# Patient Record
Sex: FEMALE | Race: WHITE | NOT HISPANIC OR LATINO | Employment: FULL TIME | URBAN - METROPOLITAN AREA
[De-identification: names, ages, dates, MRNs, and addresses within clinical notes are randomized per-mention and may not be internally consistent; named-entity substitution may affect disease eponyms.]

---

## 2017-01-09 ENCOUNTER — OFFICE VISIT (OUTPATIENT)
Dept: FAMILY MEDICINE | Facility: OTHER | Age: 47
End: 2017-01-09
Payer: COMMERCIAL

## 2017-01-09 VITALS
SYSTOLIC BLOOD PRESSURE: 96 MMHG | TEMPERATURE: 98.6 F | HEIGHT: 67 IN | WEIGHT: 162.2 LBS | RESPIRATION RATE: 16 BRPM | HEART RATE: 65 BPM | DIASTOLIC BLOOD PRESSURE: 56 MMHG | OXYGEN SATURATION: 96 % | BODY MASS INDEX: 25.46 KG/M2

## 2017-01-09 DIAGNOSIS — J20.9 ACUTE BRONCHITIS WITH SYMPTOMS > 10 DAYS: Primary | ICD-10-CM

## 2017-01-09 PROCEDURE — 99213 OFFICE O/P EST LOW 20 MIN: CPT | Performed by: STUDENT IN AN ORGANIZED HEALTH CARE EDUCATION/TRAINING PROGRAM

## 2017-01-09 RX ORDER — AZITHROMYCIN 250 MG/1
TABLET, FILM COATED ORAL
Qty: 6 TABLET | Refills: 0 | Status: SHIPPED | OUTPATIENT
Start: 2017-01-09 | End: 2017-02-06

## 2017-01-09 ASSESSMENT — PAIN SCALES - GENERAL: PAINLEVEL: NO PAIN (0)

## 2017-01-09 ASSESSMENT — ENCOUNTER SYMPTOMS: COUGH: 1

## 2017-01-09 NOTE — MR AVS SNAPSHOT
After Visit Summary   1/9/2017    Mercedes Segal    MRN: [id number]           Patient Information     Date Of Birth          1970        Visit Information        Provider Department      1/9/2017 5:50 PM Brionna Pino APRN CNP [facility]        Today's Diagnoses     Acute bronchitis with symptoms > 10 days    -  1       Care Instructions    Azithromycin - take as directed on packaging.    Return to clinic if symptoms are worsening or not improving.    Deborah Pino CNP  [phone]    1.  Stay hydrated and rest - Drink plenty of fluid, 8-10 glasses of water per day and get lots of rest.    2. Mucinex - you can try Mucinex (immediate release) 200-400 mg every 4 hours OR Mucinex (extended release) 600-1200 mg every 12 hours as needed for congestion.  Mucinex helps thin out your mucus so that it is easier to get it out of your body.    3.  Flonase - 1 spray in each nostril daily.  This is a steroid nasal spray that can help decrease inflammation in the nose and sinuses resulting in less nasal congestion.    4. Oral decongestants - you may take a nasal decongestant for up to 3 days as needed.  Sudafed (pseudoephedrine) can be used if you do not have high blood pressure.  The dosing for Sudafed is 60 mg every 4-6 hours as needed for immediate release.  For extended release it is 120 mg every 12 hours.  If you have high blood pressure you can try Coricidin HBP.    5. Nasal decongestant - Afrin nasal spray 2-3 sprays in each nostril twice daily for up to 3 days can help improve nasal congestion.  Do not use longer than 3 days as congestion can worsen with prolonged use.    6. Humidifier - use a humidifier in the places you spend the most time.  This will help moisturize the air you breath in and help improve cough.    7. Headache, sore throat, fever - you can try ibuprofen (maximum of 2400 mg/24 hours) or Tylenol (maximum of 3,000 mg/24 hours).    - do not use Tylenol if you  "have liver disease  - do not use Ibuprofen if you have kidney disease or take a blood thinner.    8. Vicks VapoRub or Mentholatum - you may try rubbing this on your chest, under your nose and over the top of your nose for congestion.  Avoid contact with eyes, mouth and inside of nose.    9. Sore throat - gargle with warm salt water as needed.  Ibuprofen can help.  Chloraseptic throat lozenges or spray can help numb the throat.       If your symptoms worsen or do not improve after lasting 10 days, please return to the clinic.    Deborah Pino, [facility]  [phone]                  Follow-ups after your visit        Who to contact     If you have questions or need follow up information about today's clinic visit or your schedule please contact [facility] directly at [phone].  Normal or non-critical lab and imaging results will be communicated to you by [employer]Dhart, letter or phone within 4 business days after the clinic has received the results. If you do not hear from us within 7 days, please contact the clinic through Was[employer]Dhart or phone. If you have a critical or abnormal lab result, we will notify you by phone as soon as possible.  Submit refill requests through [email or web address] or call your pharmacy and they will forward the refill request to us. Please allow 3 business days for your refill to be completed.          Additional Information About Your Visit        Power.com Information     [email or web address] lets you send messages to your doctor, view your test results, renew your prescriptions, schedule appointments and more. To sign up, go to [email or web address] . Click on \"Log in\" on the left side of the screen, which will take you to the Welcome page. Then click on \"Sign up Now\" on the right side of the page.     You will be asked to enter the access code listed below, as well as some personal information. Please follow the directions to create your username and password.     Your access code is: [email or web address]  Expires: " "2017  6:14 PM     Your access code will  in 90 days. If you need help or a new code, please call your [facility] or [phone].        Care EveryWhere ID     This is your Care EveryWhere ID. This could be used by other organizations to access your [place] medical records  [id number]        Your Vitals Were     Pulse Temperature Respirations Height BMI (Body Mass Index) Pulse Oximetry    65 98.6  F (37  C) (Temporal) 16 5' 6.53\" (1.69 m) 25.76 kg/m2 96%       Blood Pressure from Last 3 Encounters:   17 96/56   16 96/64   16 104/64    Weight from Last 3 Encounters:   17 162 lb 3.2 oz (73.573 kg)   16 166 lb (75.297 kg)   16 167 lb (75.751 kg)              Today, you had the following     No orders found for display         Today's Medication Changes          These changes are accurate as of: 17  6:14 PM.  If you have any questions, ask your nurse or doctor.               Start taking these medicines.        Dose/Directions    azithromycin 250 MG tablet   Commonly known as:  ZITHROMAX   Used for:  Acute bronchitis with symptoms > 10 days   Started by:  Brionna Pino APRN CNP        Two tablets first day, then one tablet daily for four days.   Quantity:  6 tablet   Refills:  0            Where to get your medicines      These medications were sent to [id number] - [place], [place] - [address]  [address] [address]    Hours:  Formerly [place] - numbers unchanged   03  Phone:  [phone]    - azithromycin 250 MG tablet             Primary Care Provider Office Phone # Fax #    Sejal Garcia [phone]       [facility]  [address] [address]        Thank you!     Thank you for choosing [facility]  for your care. Our goal is always to provide you with excellent care. Hearing back from our patients is one way we can continue to improve our services. Please take a " few minutes to complete the written survey that you may receive in the mail after your visit with us. Thank you!             Your Updated Medication List - Protect others around you: Learn how to safely use, store and throw away your medicines at www.disposemymeds.org.          This list is accurate as of: 1/9/17  6:14 PM.  Always use your most recent med list.                   Brand Name Dispense Instructions for use    azithromycin 250 MG tablet    ZITHROMAX    6 tablet    Two tablets first day, then one tablet daily for four days.       buPROPion 150 MG 24 hr tablet    WELLBUTRIN XL    30 tablet    Take 1 tablet (150 mg) by mouth every morning       * nicotine 14 MG/24HR 24 hr patch    NICODERM CQ    30 patch    Place 1 patch onto the skin every 24 hours       * nicotine 21 MG/24HR 24 hr patch    NICODERM CQ    30 patch    Place 1 patch onto the skin every 24 hours       * Notice:  This list has 2 medication(s) that are the same as other medications prescribed for you. Read the directions carefully, and ask your doctor or other care provider to review them with you.

## 2017-01-09 NOTE — PROGRESS NOTES
"  SUBJECTIVE:                                                    Mercedes Segal is a 46 year old female who presents to clinic today for the following health issues:      Cough        Acute Illness   Acute illness concerns: Cough, chest congestion  Onset: 2 weeks     Fever: no    Chills/Sweats: no    Headache (location?): no    Sinus Pressure:no    Conjunctivitis:  no    Ear Pain: no    Rhinorrhea: no    Congestion: YES- chest    Sore Throat: no     Cough: YES-productive of yellow sputum    Wheeze: no    Decreased Appetite: no    Nausea: no    Vomiting: no    Diarrhea:  YES - when taking mucinex    Dysuria/Freq.: no    Fatigue/Achiness: YES    Sick/Strep Exposure: no     Therapies Tried and outcome: Mucinex DM - helps with cough  Smoke      Problem list and histories reviewed & adjusted, as indicated.  Additional history: as documented    Labs reviewed in EPIC  Problem list, Medication list, Allergies, and Medical/Social/Surgical histories reviewed in [facility] and updated as appropriate.    ROS:  Constitutional, HEENT, cardiovascular, pulmonary, gi and gu systems are negative, except as otherwise noted.    OBJECTIVE:                                                    BP 96/56 mmHg  Pulse 65  Temp(Src) 98.6  F (37  C) (Temporal)  Resp 16  Ht 5' 6.53\" (1.69 m)  Wt 162 lb 3.2 oz (73.573 kg)  BMI 25.76 kg/m2  SpO2 96%  Body mass index is 25.76 kg/(m^2).  GENERAL: healthy, alert and no distress  EYES: Eyes grossly normal to inspection, PERRL and conjunctivae and sclerae normal  HENT: ear canals and TM's normal, nose and mouth without ulcers or lesions  NECK: no adenopathy, no asymmetry, masses, or scars  RESP: lungs clear to auscultation - no rales, rhonchi or wheezes  CV: regular rate and rhythm, normal S1 S2, no S3 or S4, no murmur, click or rub  MS: no gross musculoskeletal defects noted, no edema  SKIN: no suspicious lesions or rashes  PSYCH: mentation appears normal, affect normal/bright    Diagnostic Test " Results:  none      ASSESSMENT/PLAN:                                                      Mercedes was seen today for cough.    Diagnoses and all orders for this visit:    Acute bronchitis with symptoms > 10 days  -     azithromycin (ZITHROMAX) 250 MG tablet; Two tablets first day, then one tablet daily for four days.    Encouraged supportive measures.  Treat with antibiotic as symptoms are lasting 14 days and worsening.  RTC if symptoms persist or fail to improve.    See Patient Instructions  Patient Instructions   Azithromycin - take as directed on packaging.    Return to clinic if symptoms are worsening or not improving.    Deborah Pino, CNP  [phone]    1.  Stay hydrated and rest - Drink plenty of fluid, 8-10 glasses of water per day and get lots of rest.    2. Mucinex - you can try Mucinex (immediate release) 200-400 mg every 4 hours OR Mucinex (extended release) 600-1200 mg every 12 hours as needed for congestion.  Mucinex helps thin out your mucus so that it is easier to get it out of your body.    3.  Flonase - 1 spray in each nostril daily.  This is a steroid nasal spray that can help decrease inflammation in the nose and sinuses resulting in less nasal congestion.    4. Oral decongestants - you may take a nasal decongestant for up to 3 days as needed.  Sudafed (pseudoephedrine) can be used if you do not have high blood pressure.  The dosing for Sudafed is 60 mg every 4-6 hours as needed for immediate release.  For extended release it is 120 mg every 12 hours.  If you have high blood pressure you can try Coricidin HBP.    5. Nasal decongestant - Afrin nasal spray 2-3 sprays in each nostril twice daily for up to 3 days can help improve nasal congestion.  Do not use longer than 3 days as congestion can worsen with prolonged use.    6. Humidifier - use a humidifier in the places you spend the most time.  This will help moisturize the air you breath in and help improve cough.    7. Headache, sore throat, fever -  you can try ibuprofen (maximum of 2400 mg/24 hours) or Tylenol (maximum of 3,000 mg/24 hours).    - do not use Tylenol if you have liver disease  - do not use Ibuprofen if you have kidney disease or take a blood thinner.    8. Vicks VapoRub or Mentholatum - you may try rubbing this on your chest, under your nose and over the top of your nose for congestion.  Avoid contact with eyes, mouth and inside of nose.    9. Sore throat - gargle with warm salt water as needed.  Ibuprofen can help.  Chloraseptic throat lozenges or spray can help numb the throat.       If your symptoms worsen or do not improve after lasting 10 days, please return to the clinic.    Deborah Pino, JENNIFER  [phone]                  Brionna Pino, DANNY [facility]

## 2017-01-10 NOTE — PATIENT INSTRUCTIONS
Azithromycin - take as directed on packaging.    Return to clinic if symptoms are worsening or not improving.    Deborah Pino, CNP  [phone]    1.  Stay hydrated and rest - Drink plenty of fluid, 8-10 glasses of water per day and get lots of rest.    2. Mucinex - you can try Mucinex (immediate release) 200-400 mg every 4 hours OR Mucinex (extended release) 600-1200 mg every 12 hours as needed for congestion.  Mucinex helps thin out your mucus so that it is easier to get it out of your body.    3.  Flonase - 1 spray in each nostril daily.  This is a steroid nasal spray that can help decrease inflammation in the nose and sinuses resulting in less nasal congestion.    4. Oral decongestants - you may take a nasal decongestant for up to 3 days as needed.  Sudafed (pseudoephedrine) can be used if you do not have high blood pressure.  The dosing for Sudafed is 60 mg every 4-6 hours as needed for immediate release.  For extended release it is 120 mg every 12 hours.  If you have high blood pressure you can try Coricidin HBP.    5. Nasal decongestant - Afrin nasal spray 2-3 sprays in each nostril twice daily for up to 3 days can help improve nasal congestion.  Do not use longer than 3 days as congestion can worsen with prolonged use.    6. Humidifier - use a humidifier in the places you spend the most time.  This will help moisturize the air you breath in and help improve cough.    7. Headache, sore throat, fever - you can try ibuprofen (maximum of 2400 mg/24 hours) or Tylenol (maximum of 3,000 mg/24 hours).    - do not use Tylenol if you have liver disease  - do not use Ibuprofen if you have kidney disease or take a blood thinner.    8. Vicks VapoRub or Mentholatum - you may try rubbing this on your chest, under your nose and over the top of your nose for congestion.  Avoid contact with eyes, mouth and inside of nose.    9. Sore throat - gargle with warm salt water as needed.  Ibuprofen can help.  Chloraseptic throat  lozenges or spray can help numb the throat.       If your symptoms worsen or do not improve after lasting 10 days, please return to the clinic.    Deborah Pino, [facility]  [phone]

## 2017-01-10 NOTE — NURSING NOTE
"Chief Complaint   Patient presents with     Cough       Initial BP 96/56 mmHg  Pulse 65  Temp(Src) 98.6  F (37  C) (Temporal)  Resp 16  Ht 5' 6.53\" (1.69 m)  Wt 162 lb 3.2 oz (73.573 kg)  BMI 25.76 kg/m2  SpO2 96% Estimated body mass index is 25.76 kg/(m^2) as calculated from the following:    Height as of this encounter: 5' 6.54\" (1.69 m).    Weight as of this encounter: 162 lb 3.2 oz (73.573 kg).  BP completed using cuff size: arsen Saldana CMA      "

## 2017-01-18 ENCOUNTER — OFFICE VISIT (OUTPATIENT)
Dept: FAMILY MEDICINE | Facility: OTHER | Age: 47
End: 2017-01-18
Payer: COMMERCIAL

## 2017-01-18 VITALS
BODY MASS INDEX: 25.8 KG/M2 | HEIGHT: 67 IN | TEMPERATURE: 98 F | HEART RATE: 70 BPM | SYSTOLIC BLOOD PRESSURE: 100 MMHG | DIASTOLIC BLOOD PRESSURE: 56 MMHG | WEIGHT: 164.4 LBS | RESPIRATION RATE: 16 BRPM | OXYGEN SATURATION: 96 %

## 2017-01-18 DIAGNOSIS — R10.13 EPIGASTRIC PAIN: ICD-10-CM

## 2017-01-18 DIAGNOSIS — K80.42 CALCULUS OF BILE DUCT WITH ACUTE CHOLECYSTITIS WITHOUT OBSTRUCTION: Primary | ICD-10-CM

## 2017-01-18 LAB
ALBUMIN SERPL-MCNC: 3.6 G/DL (ref 3.4–5)
ALP SERPL-CCNC: 43 U/L (ref 40–150)
ALT SERPL W P-5'-P-CCNC: 31 U/L (ref 0–50)
ANION GAP SERPL CALCULATED.3IONS-SCNC: 5 MMOL/L (ref 3–14)
AST SERPL W P-5'-P-CCNC: 20 U/L (ref 0–45)
BASOPHILS # BLD AUTO: 0 10E9/L (ref 0–0.2)
BASOPHILS NFR BLD AUTO: 0.2 %
BILIRUB SERPL-MCNC: 0.4 MG/DL (ref 0.2–1.3)
BUN SERPL-MCNC: 13 MG/DL (ref 7–30)
CALCIUM SERPL-MCNC: 8.5 MG/DL (ref 8.5–10.1)
CHLORIDE SERPL-SCNC: 106 MMOL/L (ref 94–109)
CO2 SERPL-SCNC: 29 MMOL/L (ref 20–32)
CREAT SERPL-MCNC: 0.74 MG/DL (ref 0.52–1.04)
DIFFERENTIAL METHOD BLD: NORMAL
EOSINOPHIL # BLD AUTO: 0.2 10E9/L (ref 0–0.7)
EOSINOPHIL NFR BLD AUTO: 2 %
ERYTHROCYTE [DISTWIDTH] IN BLOOD BY AUTOMATED COUNT: 13.8 % (ref 10–15)
GFR SERPL CREATININE-BSD FRML MDRD: 85 ML/MIN/1.7M2
GLUCOSE SERPL-MCNC: 87 MG/DL (ref 70–99)
HCT VFR BLD AUTO: 39.3 % (ref 35–47)
HGB BLD-MCNC: 13.2 G/DL (ref 11.7–15.7)
LIPASE SERPL-CCNC: 142 U/L (ref 73–393)
LYMPHOCYTES # BLD AUTO: 2.3 10E9/L (ref 0.8–5.3)
LYMPHOCYTES NFR BLD AUTO: 23.4 %
MCH RBC QN AUTO: 30.5 PG (ref 26.5–33)
MCHC RBC AUTO-ENTMCNC: 33.6 G/DL (ref 31.5–36.5)
MCV RBC AUTO: 91 FL (ref 78–100)
MONOCYTES # BLD AUTO: 0.8 10E9/L (ref 0–1.3)
MONOCYTES NFR BLD AUTO: 8.2 %
NEUTROPHILS # BLD AUTO: 6.4 10E9/L (ref 1.6–8.3)
NEUTROPHILS NFR BLD AUTO: 66.2 %
PLATELET # BLD AUTO: 324 10E9/L (ref 150–450)
POTASSIUM SERPL-SCNC: 4.1 MMOL/L (ref 3.4–5.3)
PROT SERPL-MCNC: 6.7 G/DL (ref 6.8–8.8)
RBC # BLD AUTO: 4.33 10E12/L (ref 3.8–5.2)
SODIUM SERPL-SCNC: 140 MMOL/L (ref 133–144)
WBC # BLD AUTO: 9.6 10E9/L (ref 4–11)

## 2017-01-18 PROCEDURE — 99214 OFFICE O/P EST MOD 30 MIN: CPT | Performed by: STUDENT IN AN ORGANIZED HEALTH CARE EDUCATION/TRAINING PROGRAM

## 2017-01-18 PROCEDURE — 36415 COLL VENOUS BLD VENIPUNCTURE: CPT | Performed by: STUDENT IN AN ORGANIZED HEALTH CARE EDUCATION/TRAINING PROGRAM

## 2017-01-18 PROCEDURE — 83690 ASSAY OF LIPASE: CPT | Performed by: STUDENT IN AN ORGANIZED HEALTH CARE EDUCATION/TRAINING PROGRAM

## 2017-01-18 PROCEDURE — 80053 COMPREHEN METABOLIC PANEL: CPT | Performed by: STUDENT IN AN ORGANIZED HEALTH CARE EDUCATION/TRAINING PROGRAM

## 2017-01-18 PROCEDURE — 85025 COMPLETE CBC W/AUTO DIFF WBC: CPT | Performed by: STUDENT IN AN ORGANIZED HEALTH CARE EDUCATION/TRAINING PROGRAM

## 2017-01-18 ASSESSMENT — PAIN SCALES - GENERAL: PAINLEVEL: EXTREME PAIN (9)

## 2017-01-18 NOTE — MR AVS SNAPSHOT
"              After Visit Summary   1/18/2017    Mercedes Segal    MRN: [id number]           Patient Information     Date Of Birth          1970        Visit Information        Provider Department      1/18/2017 10:40 AM Brionna Pino APRN CNP [facility]        Today's Diagnoses     Epigastric pain    -  1       Care Instructions    Lab work - I will call you with results.    Ultrasound     Go to ER if symptoms worsen significantly, you develop shortness of breath or chest pain.    Deborah Pino CNP  [phone]        Follow-ups after your visit        Future tests that were ordered for you today     Open Future Orders        Priority Expected Expires Ordered    US Abdomen Complete Routine  1/18/2018 1/18/2017            Who to contact     If you have questions or need follow up information about today's clinic visit or your schedule please contact [facility] directly at [phone].  Normal or non-critical lab and imaging results will be communicated to you by MyChart, letter or phone within 4 business days after the clinic has received the results. If you do not hear from us within 7 days, please contact the clinic through MyChart or phone. If you have a critical or abnormal lab result, we will notify you by phone as soon as possible.  Submit refill requests through Muzui or call your pharmacy and they will forward the refill request to us. Please allow 3 business days for your refill to be completed.          Additional Information About Your Visit        MyChart Information     Muzui lets you send messages to your doctor, view your test results, renew your prescriptions, schedule appointments and more. To sign up, go to [email or web address] . Click on \"Log in\" on the left side of the screen, which will take you to the Welcome page. Then click on \"Sign up Now\" on the right side of the page.     You will be asked to enter the access code listed below, as " "well as some personal information. Please follow the directions to create your username and password.     Your access code is: [email or web address]  Expires: 2017  6:14 PM     Your access code will  in 90 days. If you need help or a new code, please call your [facility] clinic or [phone].        Care EveryWhere ID     This is your Care EveryWhere ID. This could be used by other organizations to access your [place] medical records  [id number]        Your Vitals Were     Pulse Temperature Respirations    70 98  F (36.7  C) (Temporal) 16    Height BMI (Body Mass Index) Pulse Oximetry    5' 6.53\" (1.69 m) 26.11 kg/m2 96%    Last Period          2017 (Approximate)         Blood Pressure from Last 3 Encounters:   17 100/56   17 96/56   16 96/64    Weight from Last 3 Encounters:   17 164 lb 6.4 oz (74.571 kg)   17 162 lb 3.2 oz (73.573 kg)   16 166 lb (75.297 kg)              We Performed the Following     CBC with platelets and differential     Comprehensive metabolic panel (BMP + Alb, Alk Phos, ALT, AST, Total. Bili, TP)     Lipase        Primary Care Provider Office Phone # Fax #    Sejal Garcia [phone]       [facility]  [address] [address]        Thank you!     Thank you for choosing [facility]  for your care. Our goal is always to provide you with excellent care. Hearing back from our patients is one way we can continue to improve our services. Please take a few minutes to complete the written survey that you may receive in the mail after your visit with us. Thank you!             Your Updated Medication List - Protect others around you: Learn how to safely use, store and throw away your medicines at www.disposemymeds.org.          This list is accurate as of: 17 11:18 AM.  Always use your most recent med list.                   Brand Name Dispense Instructions for use    azithromycin 250 MG " tablet    ZITHROMAX    6 tablet    Two tablets first day, then one tablet daily for four days.       buPROPion 150 MG 24 hr tablet    WELLBUTRIN XL    30 tablet    Take 1 tablet (150 mg) by mouth every morning       * nicotine 14 MG/24HR 24 hr patch    NICODERM CQ    30 patch    Place 1 patch onto the skin every 24 hours       * nicotine 21 MG/24HR 24 hr patch    NICODERM CQ    30 patch    Place 1 patch onto the skin every 24 hours       * Notice:  This list has 2 medication(s) that are the same as other medications prescribed for you. Read the directions carefully, and ask your doctor or other care provider to review them with you.

## 2017-01-18 NOTE — PATIENT INSTRUCTIONS
Lab work - I will call you with results.    Ultrasound     Go to ER if symptoms worsen significantly, you develop shortness of breath or chest pain.    Deborah Pino, CNP  [phone]

## 2017-01-18 NOTE — PROGRESS NOTES
"  SUBJECTIVE:                                                    Mercedes Segal is a 46 year old female who presents to clinic today for the following health issues:      ABDOMINAL PAIN     Onset: 4 days     Description:   Character: Sharp  Location: epigastric region  Radiation: None    Intensity: severe    Progression of Symptoms:  worsening    Accompanying Signs & Symptoms:  Fever/Chills?: no   Gas/Bloating: YES- bloated when waking up from pain  Nausea: no   Vomitting: no   Diarrhea?: no   Constipation:no   Dysuria or Hematuria: no    History:   Trauma: no   Previous similar pain: YES- around [place]   Previous tests done: none    Precipitating factors:   Does the pain change with:     Food: no      BM: no     Urination: no     Alleviating factors:  Ibuprofen    Therapies Tried and outcome: Ibuprofen - helps with pain    LMP:  Couple weeks ago. Approx 1/1/2017     Pt states pain only comes in the middle of the night and wakes her up. She initially thought it was pork when she had pain the first time around [place] and stopped eating pork. But still has pain.    Pt states she has diarrhea when eating salad. Wondering why she would have that. When coughing or sneezing, she urinates a little bit and wondering if there is something she can do about that.     Two hours after ibuprofen to relieve pain  Smokes - 1 pack a day     Problem list and histories reviewed & adjusted, as indicated.  Additional history: as documented    Labs reviewed in EPIC  Problem list, Medication list, Allergies, and Medical/Social/Surgical histories reviewed in [facility] and updated as appropriate.    ROS:  Constitutional, HEENT, cardiovascular, pulmonary, gi and gu systems are negative, except as otherwise noted.    OBJECTIVE:                                                    /56 mmHg  Pulse 70  Temp(Src) 98  F (36.7  C) (Temporal)  Resp 16  Ht 5' 6.53\" (1.69 m)  Wt 164 lb 6.4 oz (74.571 kg)  BMI 26.11 kg/m2  SpO2 96%  LMP " 01/01/2017 (Approximate)  Body mass index is 26.11 kg/(m^2).  GENERAL: healthy, alert and no distress  EYES: Eyes grossly normal to inspection, PERRL and conjunctivae and sclerae normal  HENT: ear canals and TM's normal, nose and mouth without ulcers or lesions  NECK: no adenopathy, no asymmetry, masses, or scars and thyroid normal to palpation  RESP: lungs clear to auscultation - no rales, rhonchi or wheezes  CV: regular rate and rhythm, normal S1 S2, no S3 or S4, no murmur, click or rub  ABDOMEN: tender to palpation over mid-upper abdomen and epigastric region, soft, no hepatosplenomegaly, no masses and bowel sounds normal, no hernia noted  MS: no gross musculoskeletal defects noted, no edema  SKIN: no suspicious lesions or rashes  NEURO: Normal strength and tone, mentation intact and speech normal  BACK: no CVA tenderness, no paralumbar tenderness  PSYCH: mentation appears normal, affect normal/bright  LYMPH: no cervical, supraclavicular, adenopathy    Diagnostic Test Results:  Lipase, CMP and CBC normal     ASSESSMENT/PLAN:                                                      Mercedes was seen today for abdominal pain.    Diagnoses and all orders for this visit:    Calculus of bile duct with acute cholecystitis without obstruction    Epigastric pain  -     Lipase  -     CBC with platelets and differential  -     Comprehensive metabolic panel (BMP + Alb, Alk Phos, ALT, AST, Total. Bili, TP)  -     Cancel: US Abdomen Complete; Future      Ultrasound of RUQ showed cholelithiasis.  GI referral was placed but is scheduling out about a month.  Will see if we can get her in sooner or may also consider general surgery referral if needed to get in sooner.      See Patient Instructions  Patient Instructions   Lab work - I will call you with results.    Ultrasound     Go to ER if symptoms worsen significantly, you develop shortness of breath or chest pain.    Deborah Pino, CNP  [phone]        Brionna Pino, APRN  [facility]

## 2017-01-18 NOTE — NURSING NOTE
"Chief Complaint   Patient presents with     Abdominal Pain       Initial /56 mmHg  Pulse 70  Temp(Src) 98  F (36.7  C) (Temporal)  Resp 16  Ht 5' 6.53\" (1.69 m)  Wt 164 lb 6.4 oz (74.571 kg)  BMI 26.11 kg/m2  SpO2 96% Estimated body mass index is 26.11 kg/(m^2) as calculated from the following:    Height as of this encounter: 5' 6.54\" (1.69 m).    Weight as of this encounter: 164 lb 6.4 oz (74.571 kg).  BP completed using cuff size: arsen Saldana CMA      "

## 2017-01-19 ENCOUNTER — RADIANT APPOINTMENT (OUTPATIENT)
Dept: ULTRASOUND IMAGING | Facility: OTHER | Age: 47
End: 2017-01-19
Attending: STUDENT IN AN ORGANIZED HEALTH CARE EDUCATION/TRAINING PROGRAM
Payer: COMMERCIAL

## 2017-01-19 DIAGNOSIS — R10.13 EPIGASTRIC PAIN: ICD-10-CM

## 2017-01-19 PROCEDURE — 76705 ECHO EXAM OF ABDOMEN: CPT

## 2017-01-20 ENCOUNTER — TELEPHONE (OUTPATIENT)
Dept: FAMILY MEDICINE | Facility: OTHER | Age: 47
End: 2017-01-20

## 2017-01-20 DIAGNOSIS — K80.42 CALCULUS OF BILE DUCT WITH ACUTE CHOLECYSTITIS WITHOUT OBSTRUCTION: Primary | ICD-10-CM

## 2017-01-20 NOTE — TELEPHONE ENCOUNTER
Spoke to patient and informed her that [facility] GI see's patient at [place]. She was fine with that.  Faxed order to [facility] to schedule at  [place].

## 2017-01-20 NOTE — TELEPHONE ENCOUNTER
PT called and stated she call  to see a gastroenterology but the first time she can get in will be the end of Feburay and that is just for a conusut . She wanted to know if there is another gastroenterology somewhere else where she can be seen sooner .Please call pt at .phone [phone].

## 2017-01-20 NOTE — TELEPHONE ENCOUNTER
[facility] phone call message- patient request for an order or referral:    Order or referral being requested: order  Reason for request: gastrology   Date needed: as soon as possible  Has the patient been seen by the PCP for this problem? YES    Additional comments: Patient wants the order to be for [facility] and [facility]    OK to leave the result message on voice mail or with a family member? YES    Call taken on 1/20/2017 at 9:17 AM by Monica Redding

## 2017-01-20 NOTE — TELEPHONE ENCOUNTER
GI has been really hard to get into- I checked [facility] and their first opening end June. The [facility] is into May. I wanted to inform you before I call patient. Not sure if you have any other ideas for patient or not.

## 2017-01-23 NOTE — TELEPHONE ENCOUNTER
Talked to patient and she was able to get in with general surgery in [place] this Wednesday.  Deborah Pino, CNP

## 2017-01-25 ENCOUNTER — TRANSFERRED RECORDS (OUTPATIENT)
Dept: HEALTH INFORMATION MANAGEMENT | Facility: CLINIC | Age: 47
End: 2017-01-25

## 2017-01-31 NOTE — PROGRESS NOTES
[address]  [facility] [address]  [phone]  Dept: [phone]    PRE-OP EVALUATION:  Today's date: 2017    Mercedes Segal (: 1970) presents for pre-operative evaluation assessment as requested by Dr. Garcia.  She requires evaluation and anesthesia risk assessment prior to undergoing surgery/procedure for treatment of gallstones.  Proposed procedure: Gall bladder removal    Date of Surgery/ Procedure: 2017  Time of Surgery/ Procedure: 10:15 AM  Hospital/Surgical Facility: [facility]  Fax number for surgical facility: [phone]  Primary Physician: Sejal Garcia  Type of Anesthesia Anticipated: General    Patient has a Health Care Directive or Living Will:  NO    Preop Questions 2017   1.  Do you have a history of heart attack, stroke, stent, bypass or surgery on an artery in the head, neck, heart or legs? No   2.  Do you ever have any pain or discomfort in your chest? No   3.  Do you have a history of  Heart Failure? No   4.   Are you troubled by shortness of breath when:  walking on a level surface, or up a slight hill, or at night? No   5.  Do you currently have a cold, bronchitis or other respiratory infection? YES - 1-1.5 weeks ago   6.  Do you have a cough, shortness of breath, or wheezing? No   7.  Do you sometimes get pains in the calves of your legs when you walk? No   8. Do you or anyone in your family have previous history of blood clots? No   9.  Do you or does anyone in your family have a serious bleeding problem such as prolonged bleeding following surgeries or cuts? No   10. Have you ever had problems with anemia or been told to take iron pills? No   11. Have you had any abnormal blood loss such as black, tarry or bloody stools, or abnormal vaginal bleeding? No   12. Have you ever had a blood transfusion? No   13. Have you or any of your relatives ever had problems with  anesthesia? No   14. Do you have sleep apnea, excessive snoring or daytime drowsiness? No   15. Do you have any prosthetic heart valves? No   16. Do you have prosthetic joints? No   17. Is there any chance that you may be pregnant? No           HPI:                                                      Brief HPI related to upcoming procedure: gallstones      See problem list for active medical problems.  Problems all longstanding and stable, except as noted/documented.  See ROS for pertinent symptoms related to these conditions.                                                                                                  .    MEDICAL HISTORY:                                                      Patient Active Problem List    Diagnosis Date Noted     Warts 2015     Priority: Medium     History of gestational diabetes 2012     Menometrorrhagia 2012      Past Medical History   Diagnosis Date     Tobacco use disorder      Past Surgical History   Procedure Laterality Date     Tonsillectomy       Dilation and curettage        section       Tubal ligation       Hc tooth extraction w/forcep       [facility]      Current Outpatient Prescriptions   Medication Sig Dispense Refill     azithromycin (ZITHROMAX) 250 MG tablet Two tablets first day, then one tablet daily for four days. 6 tablet 0     buPROPion (WELLBUTRIN XL) 150 MG 24 hr tablet Take 1 tablet (150 mg) by mouth every morning 30 tablet 3     nicotine (NICODERM CQ) 21 MG/24HR patch 2h hr Place 1 patch onto the skin every 24 hours 30 patch 3     nicotine (NICODERM CQ) 14 MG/24HR patch 2h hr Place 1 patch onto the skin every 24 hours 30 patch 1     OTC products: ibuprofen    Allergies   Allergen Reactions     Penicillins Rash     Pt was a baby      Latex Allergy: NO    Social History   Substance Use Topics     Smoking status: Current Every Day Smoker -- 1.00 packs/day for 20 years     Types: Cigarettes     Smokeless tobacco: Never Used      Alcohol Use: No     History   Drug Use No       REVIEW OF SYSTEMS:                                                    Constitutional, neuro, ENT, endocrine, pulmonary, cardiac, gastrointestinal, genitourinary, musculoskeletal, integument and psychiatric systems are negative, except as otherwise noted.    EXAM:                                                    LMP 01/01/2017 (Approximate)    GENERAL APPEARANCE: healthy, alert and no distress     EYES: EOMI,- PERRL     HENT: ear canals and TM's normal and nose and mouth without ulcers or lesions     NECK: no adenopathy, no asymmetry, masses, or scars and thyroid normal to palpation     RESP: lungs clear to auscultation - no rales, rhonchi or wheezes     BREAST: normal without masses, tenderness or nipple discharge and no palpable axillary masses or adenopathy     CV: regular rates and rhythm, normal S1 S2, no S3 or S4 and no murmur, click or rub -     ABDOMEN:  soft, nontender, no HSM or masses and bowel sounds normal     : normal cervix, adnexae, and uterus without masses or discharge and rectal exam normal without masses-guaiac negative stool     MS: extremities normal- no gross deformities noted, no evidence of inflammation in joints, FROM in all extremities.     SKIN: no suspicious lesions or rashes     NEURO: Normal strength and tone, sensory exam grossly normal, mentation intact and speech normal     PSYCH: mentation appears normal. and affect normal/bright     LYMPHATICS: No axillary, cervical, inguinal, or supraclavicular nodes    DIAGNOSTICS:                                                    No labs or EKG required for low risk surgery (cataract, skin procedure, breast biopsy, etc)    Recent Labs   Lab Test  01/18/17   1132  05/23/16   0801  04/22/10   HGB  13.2  13.6   < >   --    PLT  324  335   < >   --    NA  140  142   --    --    POTASSIUM  4.1  4.2   --   4.1   CR  0.74  0.73   --   0.68   A1C   --    --    --   5.1    < > = values in this  interval not displayed.        IMPRESSION:                                                    Reason for surgery/procedure: gallstones  Diagnosis/reason for consult: preop    The proposed surgical procedure is considered LOW risk.    REVISED CARDIAC RISK INDEX  The patient has the following serious cardiovascular risks for perioperative complications such as (MI, PE, VFib and 3  AV Block):  No serious cardiac risks  INTERPRETATION: 0 risks: Class I (very low risk - 0.4% complication rate)    The patient has the following additional risks for perioperative complications:  No identified additional risks    No diagnosis found.    RECOMMENDATIONS:                                                        APPROVAL GIVEN to proceed with proposed procedure, without further diagnostic evaluation       Signed Electronically by: DANNY Muniz CNP    Copy of this evaluation report is provided to requesting physician.    [facility] Preop Guidelines

## 2017-02-06 ENCOUNTER — OFFICE VISIT (OUTPATIENT)
Dept: FAMILY MEDICINE | Facility: OTHER | Age: 47
End: 2017-02-06
Payer: COMMERCIAL

## 2017-02-06 VITALS
RESPIRATION RATE: 16 BRPM | OXYGEN SATURATION: 97 % | SYSTOLIC BLOOD PRESSURE: 108 MMHG | HEIGHT: 67 IN | BODY MASS INDEX: 26.02 KG/M2 | DIASTOLIC BLOOD PRESSURE: 64 MMHG | WEIGHT: 165.8 LBS | TEMPERATURE: 98.7 F | HEART RATE: 69 BPM

## 2017-02-06 DIAGNOSIS — Z01.818 PREOP GENERAL PHYSICAL EXAM: Primary | ICD-10-CM

## 2017-02-06 PROCEDURE — 99214 OFFICE O/P EST MOD 30 MIN: CPT | Performed by: STUDENT IN AN ORGANIZED HEALTH CARE EDUCATION/TRAINING PROGRAM

## 2017-02-06 ASSESSMENT — PAIN SCALES - GENERAL: PAINLEVEL: NO PAIN (0)

## 2017-02-06 NOTE — MR AVS SNAPSHOT
After Visit Summary   2/6/2017    Mercedes Segal    MRN: [id number]           Patient Information     Date Of Birth          1970        Visit Information        Provider Department      2/6/2017 5:50 PM Brionna Pino APRN CNP [facility]        Today's Diagnoses     Preop general physical exam    -  1      Care Instructions      Before Your Surgery      Call your surgeon if there is any change in your health. This includes signs of a cold or flu (such as a sore throat, runny nose, cough, rash or fever).    Do not smoke, drink alcohol or take over the counter medicine (unless your surgeon or primary care doctor tells you to) for the 24 hours before and after surgery.    If you take prescribed drugs: Follow your doctor s orders about which medicines to take and which to stop until after surgery.    Eating and drinking prior to surgery: follow the instructions from your surgeon    Take a shower or bath the night before surgery. Use the soap your surgeon gave you to gently clean your skin. If you do not have soap from your surgeon, use your regular soap. Do not shave or scrub the surgery site.  Wear clean pajamas and have clean sheets on your bed.         Follow-ups after your visit        Who to contact     If you have questions or need follow up information about today's clinic visit or your schedule please contact [facility] directly at [phone].  Normal or non-critical lab and imaging results will be communicated to you by MyChart, letter or phone within 4 business days after the clinic has received the results. If you do not hear from us within 7 days, please contact the clinic through MyChart or phone. If you have a critical or abnormal lab result, we will notify you by phone as soon as possible.  Submit refill requests through [email or web address] or call your pharmacy and they will forward the refill request to us. Please allow 3 business days for your refill  "to be completed.          Additional Information About Your Visit        [employer] Information     [employer] lets you send messages to your doctor, view your test results, renew your prescriptions, schedule appointments and more. To sign up, go to [email or web address] . Click on \"Log in\" on the left side of the screen, which will take you to the Welcome page. Then click on \"Sign up Now\" on the right side of the page.     You will be asked to enter the access code listed below, as well as some personal information. Please follow the directions to create your username and password.     Your access code is: [email or web address]  Expires: 2017  6:14 PM     Your access code will  in 90 days. If you need help or a new code, please call your [facility] clinic or [phone].        Care EveryWhere ID     This is your Care EveryWhere ID. This could be used by other organizations to access your [facility] medical records  [id number]        Your Vitals Were     Pulse Temperature Respirations Height Last Period Pulse Oximetry    69 98.7  F (37.1  C) (Temporal) 16 5' 6.53\" (1.69 m) 2017 (Approximate) 97%    BMI (Body Mass Index)                   26.34 kg/m2            Blood Pressure from Last 3 Encounters:   17 108/64   17 100/56   17 96/56    Weight from Last 3 Encounters:   17 165 lb 12.8 oz (75.2 kg)   17 164 lb 6.4 oz (74.6 kg)   17 162 lb 3.2 oz (73.6 kg)              Today, you had the following     No orders found for display       Primary Care Provider Office Phone # Fax #    Sejal Garcia [phone]       [facility]  [address] [address]        Thank you!     Thank you for choosing [facility]  for your care. Our goal is always to provide you with excellent care. Hearing back from our patients is one way we can continue to improve our services. Please take a few minutes to complete the written survey that you may " receive in the mail after your visit with us. Thank you!             Your Updated Medication List - Protect others around you: Learn how to safely use, store and throw away your medicines at www.disposemymeds.org.          This list is accurate as of: 2/6/17 11:59 PM.  Always use your most recent med list.                   Brand Name Dispense Instructions for use    buPROPion 150 MG 24 hr tablet    WELLBUTRIN XL    30 tablet    Take 1 tablet (150 mg) by mouth every morning       * nicotine 14 MG/24HR 24 hr patch    NICODERM CQ    30 patch    Place 1 patch onto the skin every 24 hours       * nicotine 21 MG/24HR 24 hr patch    NICODERM CQ    30 patch    Place 1 patch onto the skin every 24 hours       * Notice:  This list has 2 medication(s) that are the same as other medications prescribed for you. Read the directions carefully, and ask your doctor or other care provider to review them with you.

## 2017-02-07 NOTE — NURSING NOTE
"Chief Complaint   Patient presents with     Pre-Op Exam     Panel Management     Flu       Initial /64 mmHg  Pulse 69  Temp(Src) 98.7  F (37.1  C) (Temporal)  Resp 16  Ht 5' 6.53\" (1.69 m)  Wt 165 lb 12.8 oz (75.206 kg)  BMI 26.33 kg/m2  SpO2 97%  LMP 01/01/2017 (Approximate) Estimated body mass index is 26.33 kg/(m^2) as calculated from the following:    Height as of this encounter: 5' 6.53\" (1.69 m).    Weight as of this encounter: 165 lb 12.8 oz (75.206 kg).  Medication Reconciliation: complete   Yulia Saldana CMA      "

## 2017-02-22 ENCOUNTER — TRANSFERRED RECORDS (OUTPATIENT)
Dept: HEALTH INFORMATION MANAGEMENT | Facility: CLINIC | Age: 47
End: 2017-02-22

## 2017-10-17 NOTE — PROGRESS NOTES
SUBJECTIVE:                                                    Mercedes Segal is a 47 year old female who presents to clinic today for the following health issues:      HPI    Joint Pain    Onset: x 1 week    Description:   Location: Right Rib Cage  Character: Sharp    Intensity: moderate, 7/10    Progression of Symptoms: better    Accompanying Signs & Symptoms:  Other symptoms: none    History:   Previous similar pain: no       Precipitating factors:   Trauma or overuse: YES- Son picked her up    Alleviating factors:  Improved by: nothing    Therapies Tried and outcome: Ibuprofen    No shortness of breath or chest pain. No bruising. Her son picked her up and she twisted her chest the opposite direction.      Problem list and histories reviewed & adjusted, as indicated.  Additional history: as documented      Current Outpatient Prescriptions   Medication Sig Dispense Refill     ibuprofen 200 MG capsule Take 200 mg by mouth every 4 hours as needed for fever       buPROPion (WELLBUTRIN XL) 150 MG 24 hr tablet Take 1 tablet (150 mg) by mouth every morning (Patient not taking: Reported on 10/20/2017) 30 tablet 3     nicotine (NICODERM CQ) 21 MG/24HR patch 2h hr Place 1 patch onto the skin every 24 hours (Patient not taking: Reported on 10/20/2017) 30 patch 3     nicotine (NICODERM CQ) 14 MG/24HR patch 2h hr Place 1 patch onto the skin every 24 hours (Patient not taking: Reported on 10/20/2017) 30 patch 1     BP Readings from Last 3 Encounters:   10/20/17 102/62   02/06/17 108/64   01/18/17 100/56    Wt Readings from Last 3 Encounters:   10/20/17 161 lb (73 kg)   02/06/17 165 lb 12.8 oz (75.2 kg)   01/18/17 164 lb 6.4 oz (74.6 kg)               Labs reviewed in EPIC      ROS:  C: NEGATIVE for fever, chills, change in weight  E/M: NEGATIVE for ear, mouth and throat problems  R: NEGATIVE for significant cough or SOB  CV: NEGATIVE for chest pain, palpitations or peripheral edema    OBJECTIVE:     /62 (BP Location:  "Right arm, Patient Position: Chair, Cuff Size: Adult Regular)  Pulse 78  Temp 98.3  F (36.8  C) (Oral)  Resp 16  Ht 5' 6.53\" (1.69 m)  Wt 161 lb (73 kg)  SpO2 98%  BMI 25.57 kg/m2  Body mass index is 25.57 kg/(m^2).  GENERAL: healthy, alert and no distress  RESP: lungs clear to auscultation - no rales, rhonchi or wheezes  CV: regular rate and rhythm, normal S1 S2, no S3 or S4, no murmur, click or rub, no peripheral edema and peripheral pulses strong  MS: Right lower ribcage tenderness (underneath right breast), skin intact, no bruising or redness, temperature normal.   SKIN: no suspicious lesions or rashes  NEURO: Normal strength and tone, mentation intact and speech normal  PSYCH: mentation appears normal, affect normal/bright    Diagnostic Test Results:  Xray - XR ribs and chest right side.     ASSESSMENT/PLAN:     1. Rib pain on right side  - Discussed conservative treatment with ice, ibuprofen, rest and deep breathing/coughing with pillow to avoid pneumonia.   - Return to clinic if symptoms worsen or do not improve, could take 6-8 weeks to improve.   - Xray to check for malalignments and fractures.   - ibuprofen (ADVIL/MOTRIN) 800 MG tablet; Take 1 tablet (800 mg) by mouth every 8 hours as needed for moderate pain For the next 1-2 weeks.  Dispense: 60 tablet; Refill: 0  - XR Ribs & Chest Right G/E 3 Views; Future    Patient Instructions   -TYLENOL 1000 mg every 6 hours; maximum daily dose: 3000 mg daily   - Ice 3-4 times daily   - Ibuprofen 800mg every 8 hours with food for the next 1-2 weeks then decrease. Not to exceed 3200mg per day.   - This could take 6-8 weeks to heal, return to clinic with any worsening symptoms, shortness of breath or no resolution.   - Hold a pillow for coughing.     DANNY Wiley CNP          Rib Contusion or Minor Fracture    A rib contusion is a bruise to one or more rib bones. It may cause pain, tenderness, swelling, and a purplish tint to the skin. There may be a " sharp pain with each breath. A rib contusion takes anywhere from a few days to a few weeks to heal. A minor rib fracture or break may cause the same symptoms as a rib contusion. The small crack may not be seen on a regular chest X-ray. Treatment for both problems is the same.  Home care    You may use over-the-counter pain medicine to control pain, unless another pain medicine was prescribed. If you have chronic liver or kidney disease or ever had a stomach ulcer or GI bleeding, talk with your healthcare provider before using these medicines.    Rest. Do not lift anything heavy or do any activity that causes pain.    Apply an ice pack over the injured area for 15 to 20 minutes every 1 to 2 hours. You should do this for the first 24 to 48 hours. You can make an ice pack by filling a plastic bag that seals at the top with ice cubes and then wrapping it with a thin towel. Continue with ice packs as needed for the relief of pain and swelling.    The first 3 to 4 weeks of healing will be the most painful. If your pain is not under control with the treatment given, call your healthcare provider. Sometimes a stronger pain medicine may be needed. A nerve block can be done in case of severe pain. It will numb the nerve between the ribs.  Follow-up care  Follow up with your healthcare provider, or as advised.  If X-rays were taken, you will be told of any new findings that may affect your care.  Call 911  Call 911 if you have:    Dizziness, weakness or fainting    Shortness of breath with or without chest discomfort    New or worsening pain  When to seek medical advice  Call your healthcare provider right away if any of these occur:    Fever of 100.4 F (38 C) or above lasting for 24 to 48 hours    Stomach pain  Date Last Reviewed: 12/2/2015 2000-2017 [employer]. [address] [place] [address], [place] [address]. All rights reserved. This information is not intended as a substitute for professional medical care.  Always follow your healthcare professional's instructions.            DANNY Wiley [facility]3]

## 2017-10-20 ENCOUNTER — OFFICE VISIT (OUTPATIENT)
Dept: FAMILY MEDICINE | Facility: OTHER | Age: 47
End: 2017-10-20
Payer: COMMERCIAL

## 2017-10-20 ENCOUNTER — RADIANT APPOINTMENT (OUTPATIENT)
Dept: GENERAL RADIOLOGY | Facility: OTHER | Age: 47
End: 2017-10-20
Attending: NURSE PRACTITIONER
Payer: COMMERCIAL

## 2017-10-20 VITALS
RESPIRATION RATE: 16 BRPM | TEMPERATURE: 98.3 F | WEIGHT: 161 LBS | OXYGEN SATURATION: 98 % | HEIGHT: 67 IN | BODY MASS INDEX: 25.27 KG/M2 | HEART RATE: 78 BPM | SYSTOLIC BLOOD PRESSURE: 102 MMHG | DIASTOLIC BLOOD PRESSURE: 62 MMHG

## 2017-10-20 DIAGNOSIS — R07.81 RIB PAIN ON RIGHT SIDE: Primary | ICD-10-CM

## 2017-10-20 DIAGNOSIS — R07.81 RIB PAIN ON RIGHT SIDE: ICD-10-CM

## 2017-10-20 PROCEDURE — 71101 X-RAY EXAM UNILAT RIBS/CHEST: CPT | Mod: RT

## 2017-10-20 PROCEDURE — 99213 OFFICE O/P EST LOW 20 MIN: CPT | Performed by: NURSE PRACTITIONER

## 2017-10-20 RX ORDER — OMEGA-3 FATTY ACIDS/FISH OIL 300-1000MG
200 CAPSULE ORAL EVERY 4 HOURS PRN
COMMUNITY
End: 2017-10-20 | Stop reason: ALTCHOICE

## 2017-10-20 RX ORDER — IBUPROFEN 800 MG/1
800 TABLET, FILM COATED ORAL EVERY 8 HOURS PRN
Qty: 60 TABLET | Refills: 0 | Status: SHIPPED | OUTPATIENT
Start: 2017-10-20

## 2017-10-20 ASSESSMENT — PAIN SCALES - GENERAL: PAINLEVEL: SEVERE PAIN (7)

## 2017-10-20 NOTE — PATIENT INSTRUCTIONS
-TYLENOL 1000 mg every 6 hours; maximum daily dose: 3000 mg daily   - Ice 3-4 times daily   - Ibuprofen 800mg every 8 hours with food for the next 1-2 weeks then decrease. Not to exceed 3200mg per day.   - This could take 6-8 weeks to heal, return to clinic with any worsening symptoms, shortness of breath or no resolution.   - Hold a pillow for coughing.     DANNY Wiley CNP          Rib Contusion or Minor Fracture    A rib contusion is a bruise to one or more rib bones. It may cause pain, tenderness, swelling, and a purplish tint to the skin. There may be a sharp pain with each breath. A rib contusion takes anywhere from a few days to a few weeks to heal. A minor rib fracture or break may cause the same symptoms as a rib contusion. The small crack may not be seen on a regular chest X-ray. Treatment for both problems is the same.  Home care    You may use over-the-counter pain medicine to control pain, unless another pain medicine was prescribed. If you have chronic liver or kidney disease or ever had a stomach ulcer or GI bleeding, talk with your healthcare provider before using these medicines.    Rest. Do not lift anything heavy or do any activity that causes pain.    Apply an ice pack over the injured area for 15 to 20 minutes every 1 to 2 hours. You should do this for the first 24 to 48 hours. You can make an ice pack by filling a plastic bag that seals at the top with ice cubes and then wrapping it with a thin towel. Continue with ice packs as needed for the relief of pain and swelling.    The first 3 to 4 weeks of healing will be the most painful. If your pain is not under control with the treatment given, call your healthcare provider. Sometimes a stronger pain medicine may be needed. A nerve block can be done in case of severe pain. It will numb the nerve between the ribs.  Follow-up care  Follow up with your healthcare provider, or as advised.  If X-rays were taken, you will be told of any new  findings that may affect your care.  Call 911  Call 911 if you have:    Dizziness, weakness or fainting    Shortness of breath with or without chest discomfort    New or worsening pain  When to seek medical advice  Call your healthcare provider right away if any of these occur:    Fever of 100.4 F (38 C) or above lasting for 24 to 48 hours    Stomach pain  Date Last Reviewed: 12/2/2015 2000-2017 [employer]. [address] [place] [address], [place] [address]. All rights reserved. This information is not intended as a substitute for professional medical care. Always follow your healthcare professional's instructions.

## 2017-10-20 NOTE — NURSING NOTE
"Chief Complaint   Patient presents with     Chest Pain     rib pain     Panel Management     mychart, flu       Initial /62 (BP Location: Right arm, Patient Position: Chair, Cuff Size: Adult Regular)  Pulse 78  Temp 98.3  F (36.8  C) (Oral)  Resp 16  Ht 5' 6.53\" (1.69 m)  Wt 161 lb (73 kg)  SpO2 98%  BMI 25.57 kg/m2 Estimated body mass index is 25.57 kg/(m^2) as calculated from the following:    Height as of this encounter: 5' 6.53\" (1.69 m).    Weight as of this encounter: 161 lb (73 kg).  Medication Reconciliation: complete   Amanda Méndez CMA (AAMA)      "

## 2017-10-20 NOTE — MR AVS SNAPSHOT
After Visit Summary   10/20/2017    Mercedes Segal    MRN: [id number]           Patient Information     Date Of Birth          1970        Visit Information        Provider Department      10/20/2017 8:20 AM Vidya Washington APRN CNP [facility]        Today's Diagnoses     Rib pain on right side    -  1      Care Instructions    -TYLENOL 1000 mg every 6 hours; maximum daily dose: 3000 mg daily   - Ice 3-4 times daily   - Ibuprofen 800mg every 8 hours with food for the next 1-2 weeks then decrease. Not to exceed 3200mg per day.   - This could take 6-8 weeks to heal, return to clinic with any worsening symptoms, shortness of breath or no resolution.   - Hold a pillow for coughing.     DANNY Wiley CNP          Rib Contusion or Minor Fracture    A rib contusion is a bruise to one or more rib bones. It may cause pain, tenderness, swelling, and a purplish tint to the skin. There may be a sharp pain with each breath. A rib contusion takes anywhere from a few days to a few weeks to heal. A minor rib fracture or break may cause the same symptoms as a rib contusion. The small crack may not be seen on a regular chest X-ray. Treatment for both problems is the same.  Home care    You may use over-the-counter pain medicine to control pain, unless another pain medicine was prescribed. If you have chronic liver or kidney disease or ever had a stomach ulcer or GI bleeding, talk with your healthcare provider before using these medicines.    Rest. Do not lift anything heavy or do any activity that causes pain.    Apply an ice pack over the injured area for 15 to 20 minutes every 1 to 2 hours. You should do this for the first 24 to 48 hours. You can make an ice pack by filling a plastic bag that seals at the top with ice cubes and then wrapping it with a thin towel. Continue with ice packs as needed for the relief of pain and swelling.    The first 3 to 4 weeks of healing will be the most  painful. If your pain is not under control with the treatment given, call your healthcare provider. Sometimes a stronger pain medicine may be needed. A nerve block can be done in case of severe pain. It will numb the nerve between the ribs.  Follow-up care  Follow up with your healthcare provider, or as advised.  If X-rays were taken, you will be told of any new findings that may affect your care.  Call 911  Call 911 if you have:    Dizziness, weakness or fainting    Shortness of breath with or without chest discomfort    New or worsening pain  When to seek medical advice  Call your healthcare provider right away if any of these occur:    Fever of 100.4 F (38 C) or above lasting for 24 to 48 hours    Stomach pain  Date Last Reviewed: 12/2/2015 2000-2017 [employer]. [address] [place] [address]. All rights reserved. This information is not intended as a substitute for professional medical care. Always follow your healthcare professional's instructions.                Follow-ups after your visit        Follow-up notes from your care team     Return if symptoms worsen or fail to improve.      Future tests that were ordered for you today     Open Future Orders        Priority Expected Expires Ordered    XR Ribs & Chest Right G/E 3 Views Routine 10/20/2017 10/20/2018 10/20/2017            Who to contact     If you have questions or need follow up information about today's clinic visit or your schedule please contact [facility] directly at [phone].  Normal or non-critical lab and imaging results will be communicated to you by MyChart, letter or phone within 4 business days after the clinic has received the results. If you do not hear from us within 7 days, please contact the clinic through MyChart or phone. If you have a critical or abnormal lab result, we will notify you by phone as soon as possible.  Submit refill requests through [employer] or call your pharmacy and they  "will forward the refill request to us. Please allow 3 business days for your refill to be completed.          Additional Information About Your Visit        [employer] Information     [employer] lets you send messages to your doctor, view your test results, renew your prescriptions, schedule appointments and more. To sign up, go to [email or web address] . Click on \"Log in\" on the left side of the screen, which will take you to the Welcome page. Then click on \"Sign up Now\" on the right side of the page.     You will be asked to enter the access code listed below, as well as some personal information. Please follow the directions to create your username and password.     Your access code is: [email or web address]  Expires: 2018  8:53 AM     Your access code will  in 90 days. If you need help or a new code, please call your [facility] clinic or [phone].        Care EveryWhere ID     This is your Care EveryWhere ID. This could be used by other organizations to access your [facility] medical records  [id number]        Your Vitals Were     Pulse Temperature Respirations Height Pulse Oximetry BMI (Body Mass Index)    78 98.3  F (36.8  C) (Oral) 16 5' 6.53\" (1.69 m) 98% 25.57 kg/m2       Blood Pressure from Last 3 Encounters:   10/20/17 102/62   17 108/64   17 100/56    Weight from Last 3 Encounters:   10/20/17 161 lb (73 kg)   17 165 lb 12.8 oz (75.2 kg)   17 164 lb 6.4 oz (74.6 kg)                 Today's Medication Changes          These changes are accurate as of: 10/20/17  8:53 AM.  If you have any questions, ask your nurse or doctor.               These medicines have changed or have updated prescriptions.        Dose/Directions    * ibuprofen 200 MG capsule   This may have changed:  Another medication with the same name was added. Make sure you understand how and when to take each.   Changed by:  Vidya Washington APRN CNP        Dose:  200 mg   Take 200 mg by mouth every 4 hours as needed " for fever   Refills:  0       * ibuprofen 800 MG tablet   Commonly known as:  ADVIL/MOTRIN   This may have changed:  You were already taking a medication with the same name, and this prescription was added. Make sure you understand how and when to take each.   Used for:  Rib pain on right side   Changed by:  Vidya Washington APRN CNP        Dose:  800 mg   Take 1 tablet (800 mg) by mouth every 8 hours as needed for moderate pain For the next 1-2 weeks.   Quantity:  60 tablet   Refills:  0       * Notice:  This list has 2 medication(s) that are the same as other medications prescribed for you. Read the directions carefully, and ask your doctor or other care provider to review them with you.         Where to get your medicines      These medications were sent to [facility] [address] [address]     Phone:  [phone]     ibuprofen 800 MG tablet                Primary Care Provider Office Phone # Fax #    Sejal Garcia [phone]       [address] [place] [address]        Equal Access to Services     [facility]: Hadii frankie Tavares, waaxda luaram, qaybta kaaljamaica azul, dottie awan . So [facility] [phone].    ATENCIÓN: Si habla español, tiene a pro disposición servicios gratuitos de asistencia lingüística. Llame al [phone].    We comply with applicable federal civil rights laws and [place] laws. We do not discriminate on the basis of race, color, national origin, age, disability, sex, sexual orientation, or gender identity.            Thank you!     Thank you for choosing [facility]  for your care. Our goal is always to provide you with excellent care. Hearing back from our patients is one way we can continue to improve our services. Please take a few minutes to complete the written survey that you may receive in the mail after your visit with us. Thank  you!             Your Updated Medication List - Protect others around you: Learn how to safely use, store and throw away your medicines at www.disposemymeds.org.          This list is accurate as of: 10/20/17  8:53 AM.  Always use your most recent med list.                   Brand Name Dispense Instructions for use Diagnosis    * ibuprofen 200 MG capsule      Take 200 mg by mouth every 4 hours as needed for fever        * ibuprofen 800 MG tablet    ADVIL/MOTRIN    60 tablet    Take 1 tablet (800 mg) by mouth every 8 hours as needed for moderate pain For the next 1-2 weeks.    Rib pain on right side       * Notice:  This list has 2 medication(s) that are the same as other medications prescribed for you. Read the directions carefully, and ask your doctor or other care provider to review them with you.

## 2018-01-19 NOTE — PROGRESS NOTES
SUBJECTIVE:                                                    Mercedes Segal is a 47 year old female who presents to clinic today for the following health issues:      HPI     Concern - discoloration on leg   Onset: 3 weeks ago     Description:   Noticed a bruise like discoloration on upper thigh on Right leg. Looks like a honey comb. Doesn't hurt, is soft. No memory of injury. Has not changed since she noticed it.    States noticed symptoms about 3 weeks ago, area approximately 5 inches by 6 inches. Macular, lacy in appearance. Non tender. Soft palpable lump underneath tissue.     She states she works a sedentary job. Current every day smoker.   Family history of leukemia in mother and brother. Father has history of lung cancer.    Problem list and histories reviewed & adjusted, as indicated.  Additional history: as documented    Patient Active Problem List   Diagnosis     History of gestational diabetes     Menometrorrhagia     Warts     Past Surgical History:   Procedure Laterality Date      SECTION       CHOLECYSTECTOMY  2017     DILATION AND CURETTAGE       HC TOOTH EXTRACTION W/FORCEP      [place] [facility]      TONSILLECTOMY       TUBAL LIGATION         Social History   Substance Use Topics     Smoking status: Current Every Day Smoker     Packs/day: 1.00     Years: 20.00     Types: Cigarettes     Smokeless tobacco: Never Used     Alcohol use No     Family History   Problem Relation Age of Onset     Other - See Comments Mother      Schizophrenia     Leukemia Mother      CLL     OSTEOPOROSIS Mother      CANCER Father      Lung Cancer     CANCER Brother      Leukemia     Asthma Other      nephew     Thyroid Disease Other      niece     Hypertension Maternal Aunt          Current Outpatient Prescriptions   Medication Sig Dispense Refill     ibuprofen (ADVIL/MOTRIN) 800 MG tablet Take 1 tablet (800 mg) by mouth every 8 hours as needed for moderate pain For the next 1-2 weeks. 60 tablet 0     Allergies  "  Allergen Reactions     Penicillins Rash     Pt was a baby     BP Readings from Last 3 Encounters:   01/23/18 118/70   10/20/17 102/62   02/06/17 108/64    Wt Readings from Last 3 Encounters:   01/23/18 162 lb 6.4 oz (73.7 kg)   10/20/17 161 lb (73 kg)   02/06/17 165 lb 12.8 oz (75.2 kg)                  Labs reviewed in EPIC        ROS:  Constitutional, HEENT, cardiovascular, pulmonary, gi and gu systems are negative, except as otherwise noted.      OBJECTIVE:   /70 (BP Location: Left arm, Patient Position: Chair, Cuff Size: Adult Regular)  Pulse 72  Temp 98.8  F (37.1  C) (Oral)  Resp 16  Ht 5' 6.83\" (1.698 m)  Wt 162 lb 6.4 oz (73.7 kg)  LMP 01/13/2018 (Exact Date)  BMI 25.56 kg/m2  Body mass index is 25.56 kg/(m^2).  GENERAL: healthy, alert and no distress  NECK: no adenopathy, no asymmetry, masses, or scars and thyroid normal to palpation  RESP: lungs clear to auscultation - no rales, rhonchi or wheezes  CV: regular rate and rhythm, normal S1 S2, no S3 or S4, no murmur, click or rub, no peripheral edema and peripheral pulses strong  MS: no gross musculoskeletal defects noted, no edema  SKIN: 5X6 inch left thigh macular lacy skin eruption, erythema, palpable mass under tissue, non mobile, irregular, non-tender.    NEURO: Normal strength and tone, mentation intact and speech normal  PSYCH: mentation appears normal, affect normal/bright    ASSESSMENT/PLAN:       1. Macular erythematous rash  Unclear etiology, possible superficial thrombus vs mass. Recommend starting with us and CBC. If unclear cause would recommend MRI for further evaluation. Concern related to non-tender mass as she has family history of cancer and is a current daily smoker.   - US Lower Extremity Venous Duplex Left; Future  - CBC with platelets and differential    Patient Instructions   Please follow up with ultra sound.     I will call you with lab results.     We will discuss treatment follow up as indicated.     Thank you  Diana " [facility]

## 2018-01-23 ENCOUNTER — RADIANT APPOINTMENT (OUTPATIENT)
Dept: ULTRASOUND IMAGING | Facility: OTHER | Age: 48
End: 2018-01-23
Attending: NURSE PRACTITIONER
Payer: COMMERCIAL

## 2018-01-23 ENCOUNTER — OFFICE VISIT (OUTPATIENT)
Dept: FAMILY MEDICINE | Facility: OTHER | Age: 48
End: 2018-01-23
Payer: COMMERCIAL

## 2018-01-23 VITALS
DIASTOLIC BLOOD PRESSURE: 70 MMHG | HEART RATE: 72 BPM | BODY MASS INDEX: 25.49 KG/M2 | SYSTOLIC BLOOD PRESSURE: 118 MMHG | HEIGHT: 67 IN | RESPIRATION RATE: 16 BRPM | TEMPERATURE: 98.8 F | WEIGHT: 162.4 LBS

## 2018-01-23 DIAGNOSIS — L53.8 MACULAR ERYTHEMATOUS RASH: Primary | ICD-10-CM

## 2018-01-23 DIAGNOSIS — L53.8 MACULAR ERYTHEMATOUS RASH: ICD-10-CM

## 2018-01-23 LAB
BASOPHILS # BLD AUTO: 0 10E9/L (ref 0–0.2)
BASOPHILS NFR BLD AUTO: 0.2 %
DIFFERENTIAL METHOD BLD: NORMAL
EOSINOPHIL # BLD AUTO: 0.2 10E9/L (ref 0–0.7)
EOSINOPHIL NFR BLD AUTO: 1.7 %
ERYTHROCYTE [DISTWIDTH] IN BLOOD BY AUTOMATED COUNT: 13.5 % (ref 10–15)
HCT VFR BLD AUTO: 40.9 % (ref 35–47)
HGB BLD-MCNC: 13.8 G/DL (ref 11.7–15.7)
LYMPHOCYTES # BLD AUTO: 1.8 10E9/L (ref 0.8–5.3)
LYMPHOCYTES NFR BLD AUTO: 17.9 %
MCH RBC QN AUTO: 30.8 PG (ref 26.5–33)
MCHC RBC AUTO-ENTMCNC: 33.7 G/DL (ref 31.5–36.5)
MCV RBC AUTO: 91 FL (ref 78–100)
MONOCYTES # BLD AUTO: 0.7 10E9/L (ref 0–1.3)
MONOCYTES NFR BLD AUTO: 7 %
NEUTROPHILS # BLD AUTO: 7.5 10E9/L (ref 1.6–8.3)
NEUTROPHILS NFR BLD AUTO: 73.2 %
PLATELET # BLD AUTO: 310 10E9/L (ref 150–450)
RBC # BLD AUTO: 4.48 10E12/L (ref 3.8–5.2)
WBC # BLD AUTO: 10.2 10E9/L (ref 4–11)

## 2018-01-23 PROCEDURE — 99214 OFFICE O/P EST MOD 30 MIN: CPT | Performed by: NURSE PRACTITIONER

## 2018-01-23 PROCEDURE — 93971 EXTREMITY STUDY: CPT | Mod: LT

## 2018-01-23 PROCEDURE — 85025 COMPLETE CBC W/AUTO DIFF WBC: CPT | Performed by: NURSE PRACTITIONER

## 2018-01-23 PROCEDURE — 36415 COLL VENOUS BLD VENIPUNCTURE: CPT | Performed by: NURSE PRACTITIONER

## 2018-01-23 NOTE — PROGRESS NOTES
Please advise Mercedes Segal,  1970, that her lab results were normal blood count. We will see what the ultra sound shows.   [phone] (home) [phone] (work)    Thank you  Diana Gagnon

## 2018-01-23 NOTE — NURSING NOTE
"No chief complaint on file.      Initial /70 (BP Location: Left arm, Patient Position: Chair, Cuff Size: Adult Regular)  Pulse 72  Temp 98.8  F (37.1  C) (Oral)  Resp 16  Ht 5' 6.83\" (1.698 m)  Wt 162 lb 6.4 oz (73.7 kg)  LMP 01/13/2018 (Exact Date)  BMI 25.56 kg/m2 Estimated body mass index is 25.56 kg/(m^2) as calculated from the following:    Height as of this encounter: 5' 6.83\" (1.698 m).    Weight as of this encounter: 162 lb 6.4 oz (73.7 kg).  Medication Reconciliation: complete      "

## 2018-01-23 NOTE — PATIENT INSTRUCTIONS
Please follow up with ultra sound.     I will call you with lab results.     We will discuss treatment follow up as indicated.     Thank you  Diana Gagnon CNP

## 2018-01-23 NOTE — PROGRESS NOTES
Please advise Mercedes Segal,  1970, that her lab results were negative blood count. Negative ultra sound. Recommend MRI for further evaluation. Please let me know if she would like MRI and I will place order.   [phone] (home) [phone] (work)    Thank you    Diana Gagnon

## 2018-01-23 NOTE — MR AVS SNAPSHOT
After Visit Summary   1/23/2018    Mercedes Segal    MRN: [id number]           Patient Information     Date Of Birth          1970        Visit Information        Provider Department      1/23/2018 7:20 AM Diana Gagnon APRN CNP [facility]        Today's Diagnoses     Thrombophlebitis of superficial veins of left lower extremity    -  1      Care Instructions    Please follow up with ultra sound.     I will call you with lab results.     We will discuss treatment follow up as indicated.     Thank you  Diana Gagnon CNP            Follow-ups after your visit        Your next 10 appointments already scheduled     Feb 08, 2018  4:45 PM CST   (Arrive by 4:30 PM)   MA SCREENING DIGITAL BILATERAL with ERMA[facility]   [facility]    [address] [place] [address] [phone]           Do not use any powder, lotion or deodorant under your arms or on your breast. If you do, we will ask you to remove it before your exam.  Wear comfortable, two-piece clothing.  If you have any allergies, tell your care team.  Bring any previous mammograms from other facilities or have them mailed to the breast center.            Feb 19, 2018  7:30 AM CST   PHYSICAL with Zoila Grider,    [facility]    [address] [place] [address] [phone]              Future tests that were ordered for you today     Open Future Orders        Priority Expected Expires Ordered    US Lower Extremity Venous Duplex Left Routine  1/23/2019 1/23/2018            Who to contact     If you have questions or need follow up information about today's clinic visit or your schedule please contact [facility] directly at [phone].  Normal or non-critical lab and imaging results will be communicated to you by MyChart, letter or phone within 4 business days after the clinic has received the  "results. If you do not hear from us within 7 days, please contact the clinic through [employer] or phone. If you have a critical or abnormal lab result, we will notify you by phone as soon as possible.  Submit refill requests through [employer] or call your pharmacy and they will forward the refill request to us. Please allow 3 business days for your refill to be completed.          Additional Information About Your Visit        Scan[employer]â‚¬Â[employer]our Information     [employer] lets you send messages to your doctor, view your test results, renew your prescriptions, schedule appointments and more. To sign up, go to [email or web address]¢â‚¬Â¢[email or web address] . Click on \"Log in\" on the left side of the screen, which will take you to the Welcome page. Then click on \"Sign up Now\" on the right side of the page.     You will be asked to enter the access code listed below, as well as some personal information. Please follow the directions to create your username and password.     Your access code is: [email or web address]  Expires: 2018  7:54 AM     Your access code will  in 90 days. If you need help or a new code, please call your [facility] clinic or [phone].        Care EveryWhere ID     This is your Care EveryWhere ID. This could be used by other organizations to access your [facility] medical records  [id number]        Your Vitals Were     Pulse Temperature Respirations Height Last Period BMI (Body Mass Index)    72 98.8  F (37.1  C) (Oral) 16 5' 6.83\" (1.698 m) 2018 (Exact Date) 25.56 kg/m2       Blood Pressure from Last 3 Encounters:   18 118/70   10/20/17 102/62   17 108/64    Weight from Last 3 Encounters:   18 162 lb 6.4 oz (73.7 kg)   10/20/17 161 lb (73 kg)   17 165 lb 12.8 oz (75.2 kg)              We Performed the Following     CBC with platelets and differential        Primary Care Provider Office Phone # Fax #    Sejal Garcia [phone]       [address] [place] [address]        Equal " Access to Services     [facility]: Hadii aad ku hadtomasjeffery Carolynarpit, wamelida luqadaha, qaybta kamiladottie mock. So [facility] [phone].    ATENCIÓN: Si habla español, tiene a pro disposición servicios gratuitos de asistencia lingüística. Llame al [phone].    We comply with applicable federal civil rights laws and [place] laws. We do not discriminate on the basis of race, color, national origin, age, disability, sex, sexual orientation, or gender identity.            Thank you!     Thank you for choosing [facility]  for your care. Our goal is always to provide you with excellent care. Hearing back from our patients is one way we can continue to improve our services. Please take a few minutes to complete the written survey that you may receive in the mail after your visit with us. Thank you!             Your Updated Medication List - Protect others around you: Learn how to safely use, store and throw away your medicines at www.disposemymeds.org.          This list is accurate as of: 1/23/18  7:54 AM.  Always use your most recent med list.                   Brand Name Dispense Instructions for use Diagnosis    ibuprofen 800 MG tablet    ADVIL/MOTRIN    60 tablet    Take 1 tablet (800 mg) by mouth every 8 hours as needed for moderate pain For the next 1-2 weeks.    Rib pain on right side

## 2018-01-25 ENCOUNTER — TELEPHONE (OUTPATIENT)
Dept: FAMILY MEDICINE | Facility: OTHER | Age: 48
End: 2018-01-25

## 2018-01-25 DIAGNOSIS — L53.8 MACULAR ERYTHEMATOUS RASH: ICD-10-CM

## 2018-01-25 DIAGNOSIS — R22.41 MASS OF RIGHT THIGH: Primary | ICD-10-CM

## 2018-01-25 NOTE — TELEPHONE ENCOUNTER
Please advise Mercedes Segal,  1970, that her lab results were negative blood count. Negative ultra sound. Recommend MRI for further evaluation. Please let me know if she would like MRI and I will place order.        Patient  Would like to do proceed with MRI. If you can place order

## 2018-01-25 NOTE — TELEPHONE ENCOUNTER
Reason for Call: Request for an order or referral:    Order or referral being requested: MRI    Date needed: at your convenience    Has the patient been seen by the PCP for this problem? YES    Additional comments: For L leg, states this was discuss at [facility] earlier this week.     Phone number Patient can be reached at: [phone]  Best Time:  Any     Can we leave a detailed message on this number?  YES    Call taken on 1/25/2018 at 11:46 AM by Kimberley Cote

## 2018-01-26 NOTE — TELEPHONE ENCOUNTER
Please call Mercedes and help her schedule MRI     Order placed in epic.     Thank you  Diana Gagnon CNP

## 2018-01-26 NOTE — TELEPHONE ENCOUNTER
Spoke to patient and informed that WS put in the MRI in and patient wanted the number to schedule herself to [place]. [phone].

## 2018-01-30 ENCOUNTER — HOSPITAL ENCOUNTER (OUTPATIENT)
Dept: MRI IMAGING | Facility: CLINIC | Age: 48
Discharge: HOME | End: 2018-01-30
Attending: NURSE PRACTITIONER | Admitting: NURSE PRACTITIONER
Payer: COMMERCIAL

## 2018-01-30 DIAGNOSIS — R22.41 MASS OF RIGHT THIGH: ICD-10-CM

## 2018-01-30 DIAGNOSIS — R22.41 MASS OF RIGHT THIGH: Primary | ICD-10-CM

## 2018-01-30 PROCEDURE — 73718 MRI LOWER EXTREMITY W/O DYE: CPT | Mod: LT

## 2018-01-30 NOTE — PROGRESS NOTES
Spoke with patient regarding skin condition and MRI results. She denies recent changes to area on right thigh we discussed that it may be due to chronic exposure to heat and she stated that she does use a heating element in her office that sits to her right. She is going to move the heater and monitor for 2 months. She will call is symptoms change or worsen. Recommend follow up with dermatology or here for biopsy.       Diana Gagnon

## 2018-10-10 NOTE — PROGRESS NOTES
SUBJECTIVE:   CC: Mercedes Segal is an 48 year old woman who presents for preventive health visit.     Physical   Annual:     Getting at least 3 servings of Calcium per day:  NO    Bi-annual eye exam:  Yes    Dental care twice a year:  Yes    Sleep apnea or symptoms of sleep apnea:  Daytime drowsiness    Diet:  Regular (no restrictions) and Breakfast skipped    Frequency of exercise:  None    Taking medications regularly:  Yes    Medication side effects:  None    Additional concerns today:  YES (right leg pain, chest pressure)     Menometrorhagia  She reports she has always been irregular, and has lately had less cramping accompanying her periods.     Right Leg Pain  Mercedes reports she experiences hamstring tightness and discomfort when her knee is bent for 15-30 minutes.     Chest Pressure  She reports it occurs spontaneously for a couple minutes. She denies it causing sleep disturbance, or being related to eating.     Answers for HPI/ROS submitted by the patient on 10/15/2018   PHQ-2 Score: 0    Abuse: Current or Past(Physical, Sexual or Emotional)- No  Do you feel safe in your environment - Yes    Social History   Substance Use Topics     Smoking status: Current Every Day Smoker     Packs/day: 1.00     Years: 20.00     Types: Cigarettes     Smokeless tobacco: Never Used     Alcohol use No     Alcohol Use 10/15/2018   If you drink alcohol do you typically have greater than 3 drinks per day OR greater than 7 drinks per week? Yes   AUDIT SCORE  6     AUDIT - Alcohol Use Disorders Identification Test - Reproduced from the World Health Organization Audit 2001 (Second Edition) 10/15/2018   1.  How often do you have a drink containing alcohol? 2 to 3 times a week   2.  How many drinks containing alcohol do you have on a typical day when you are drinking? 1 or 2   3.  How often do you have five or more drinks on one occasion? Weekly   4.  How often during the last year have you found that you were not able to stop drinking  once you had started? Never   5.  How often during the last year have you failed to do what was normally expected of you because of drinking? Never   6.  How often during the last year have you needed a first drink in the morning to get yourself going after a heavy drinking session? Never   7.  How often during the last year have you had a feeling of guilt or remorse after drinking? Never   8.  How often during the last year have you been unable to remember what happened the night before because of your drinking? Never   9.  Have you or someone else been injured because of your drinking? No   10. Has a relative, friend, doctor or other health care worker been concerned about your drinking or suggested you cut down? No   TOTAL SCORE 6       Reviewed orders with patient.  Reviewed health maintenance and updated orders accordingly - Yes  Labs reviewed in EPIC    Patient under age 50, mutual decision reflected in health maintenance.      Pertinent mammograms are reviewed under the imaging tab.  History of abnormal Pap smear: NO - age 30- 65 PAP every 5 years recommended with HPV co-testing.  PAP / HPV Latest Ref Rng & Units 2016   PAP - NIL NIL   HPV 16 DNA NEG Negative -   HPV 18 DNA NEG Negative -   OTHER HR HPV NEG Negative -     Reviewed and updated as needed this visit by clinical staff  Tobacco  Allergies  Meds  Med Hx  Surg Hx  Fam Hx  Soc Hx        Reviewed and updated as needed this visit by Provider        Past Medical History:   Diagnosis Date     Tobacco use disorder       Past Surgical History:   Procedure Laterality Date      SECTION       CHOLECYSTECTOMY  2017     DILATION AND CURETTAGE       HC TOOTH EXTRACTION W/FORCEP      [place] Teeth      TONSILLECTOMY       TUBAL LIGATION         Review of Systems   Constitutional: Negative for chills and fever.   HENT: Negative for congestion, ear pain, hearing loss and sore throat.    Eyes: Negative for pain and visual  "disturbance.   Respiratory: Positive for cough. Negative for shortness of breath.    Cardiovascular: Positive for chest pain. Negative for peripheral edema.   Gastrointestinal: Negative for abdominal pain, constipation, diarrhea, heartburn, hematochezia and nausea.   Breasts:  Negative for tenderness, breast mass and discharge.   Genitourinary: Negative for dysuria, frequency, genital sores, hematuria, pelvic pain, urgency, vaginal bleeding and vaginal discharge.   Musculoskeletal: Positive for arthralgias. Negative for joint swelling and myalgias.   Skin: Negative for rash.   Neurological: Negative for dizziness, weakness, headaches and paresthesias.   Psychiatric/Behavioral: Negative for mood changes. The patient is not nervous/anxious.      This document serves as a record of the services and decisions personally performed and made by Sejal Garcia MD. It was created on her behalf by Logan Corley, a trained medical scribe. The creation of this document is based the provider's statements to the medical scribe.  Logan Corley, October 15, 2018 5:38 PM      OBJECTIVE:   /66  Pulse 64  Temp 98.1  F (36.7  C) (Temporal)  Ht 5' 6.5\" (1.689 m)  Wt 169 lb (76.7 kg)  SpO2 97%  Breastfeeding? No  BMI 26.87 kg/m2  Physical ExamGENERAL: healthy, alert and no distress  EYES: Eyes grossly normal to inspection, PERRL and conjunctivae and sclerae normal  HENT: ear canals and TM's normal, nose and mouth without ulcers or lesions  NECK: no adenopathy, no asymmetry, masses, or scars and thyroid normal to palpation  RESP: lungs clear to auscultation - no rales, rhonchi or wheezes  BREAST: normal without masses, tenderness or nipple discharge and no palpable axillary masses or adenopathy  CV: regular rate and rhythm, normal S1 S2, no S3 or S4, no murmur, click or rub, no peripheral edema and peripheral pulses strong  ABDOMEN: soft, nontender, no hepatosplenomegaly, no masses and bowel sounds normal  MS: no gross " musculoskeletal defects noted, no edema, Left anterior chest wall discomfort.  SKIN: no suspicious lesions or rashes to visible skin  NEURO: Normal strength and tone, mentation intact and speech normal  PSYCH: mentation appears normal, affect normal/bright  Left Hip Exam     Range of Motion   Extension:        Flexion:                 70  Internal Rotation:   External Rotation:  Abduction:             Adduction:               Right Hip Exam     Range of Motion   Extension:        Flexion:                 50  Internal Rotation:   External Rotation:  Abduction:             Adduction:               Comments:  Hamstring tightness on hip flexion           No results found for this or any previous visit (from the past 24 hour(s)).    ASSESSMENT/PLAN:       ICD-10-CM    1. Encounter for routine adult health examination without abnormal findings Z00.00 Lipid panel reflex to direct LDL Non-fasting   2. History of gestational diabetes Z86.32 Glucose   3. Screen for STD (sexually transmitted disease) Z11.3 HIV Antigen Antibody Combo     Treponema Abs w Reflex to RPR and Titer     Hepatitis B surface antigen     Hepatitis C antibody   4. Strain of right hamstring muscle, initial encounter S76.311A    5. Chest wall pain R07.89      Menometrorrhagia:  Patient is doing well, discussed perimenopause symptoms and maintenance.    Right Leg Stiffness:  Hamstring tension noted, recommended stretching and strengthening exercises.     Reassured on chest wall pain -- does not appear cardiac or lung related.    Health Maintenance:  Order for mammogram renewed, which Mercedes will order. Offered influenza vaccine, which she declines. Order placed for labs that the patient will complete today: non-fasting glucose, HIV screening, Hepatitis B&C, and Treponema.    COUNSELING:  Reviewed preventive health counseling, as reflected in patient instructions       Regular exercise       Healthy diet/nutrition       Alcohol Use       HIV screening        "(Jo)menopause management    BP Readings from Last 1 Encounters:   10/15/18 112/66     Estimated body mass index is 26.87 kg/(m^2) as calculated from the following:    Height as of this encounter: 5' 6.5\" (1.689 m).    Weight as of this encounter: 169 lb (76.7 kg).      Weight management plan: Discussed healthy diet and exercise guidelines and patient will follow up in 12 months in clinic to re-evaluate.     reports that she has been smoking Cigarettes.  She has a 20.00 pack-year smoking history. She has never used smokeless tobacco.  Tobacco Cessation Action Plan: Pharmacotherapies : unsure    Counseling Resources:  ATP IV Guidelines  Pooled Cohorts Equation Calculator  Breast Cancer Risk Calculator  FRAX Risk Assessment  ICSI Preventive Guidelines  Dietary Guidelines for Americans, 2010  USDA's MyPlate  ASA Prophylaxis  Lung CA Screening    Sejal Garcia MD, MD  [facility]  "

## 2018-10-15 ENCOUNTER — OFFICE VISIT (OUTPATIENT)
Dept: FAMILY MEDICINE | Facility: OTHER | Age: 48
End: 2018-10-15
Payer: COMMERCIAL

## 2018-10-15 VITALS
TEMPERATURE: 98.1 F | OXYGEN SATURATION: 97 % | WEIGHT: 169 LBS | HEIGHT: 67 IN | BODY MASS INDEX: 26.53 KG/M2 | SYSTOLIC BLOOD PRESSURE: 112 MMHG | HEART RATE: 64 BPM | DIASTOLIC BLOOD PRESSURE: 66 MMHG

## 2018-10-15 DIAGNOSIS — Z86.32 HISTORY OF GESTATIONAL DIABETES: ICD-10-CM

## 2018-10-15 DIAGNOSIS — R07.89 CHEST WALL PAIN: ICD-10-CM

## 2018-10-15 DIAGNOSIS — Z11.3 SCREEN FOR STD (SEXUALLY TRANSMITTED DISEASE): ICD-10-CM

## 2018-10-15 DIAGNOSIS — Z00.00 ENCOUNTER FOR ROUTINE ADULT HEALTH EXAMINATION WITHOUT ABNORMAL FINDINGS: Primary | ICD-10-CM

## 2018-10-15 DIAGNOSIS — S76.311A STRAIN OF RIGHT HAMSTRING MUSCLE, INITIAL ENCOUNTER: ICD-10-CM

## 2018-10-15 PROCEDURE — 80061 LIPID PANEL: CPT | Performed by: FAMILY MEDICINE

## 2018-10-15 PROCEDURE — 99396 PREV VISIT EST AGE 40-64: CPT | Performed by: FAMILY MEDICINE

## 2018-10-15 PROCEDURE — 82947 ASSAY GLUCOSE BLOOD QUANT: CPT | Performed by: FAMILY MEDICINE

## 2018-10-15 PROCEDURE — 36415 COLL VENOUS BLD VENIPUNCTURE: CPT | Performed by: FAMILY MEDICINE

## 2018-10-15 PROCEDURE — 87340 HEPATITIS B SURFACE AG IA: CPT | Performed by: FAMILY MEDICINE

## 2018-10-15 PROCEDURE — 86803 HEPATITIS C AB TEST: CPT | Performed by: FAMILY MEDICINE

## 2018-10-15 PROCEDURE — 87389 HIV-1 AG W/HIV-1&-2 AB AG IA: CPT | Performed by: FAMILY MEDICINE

## 2018-10-15 PROCEDURE — 86780 TREPONEMA PALLIDUM: CPT | Performed by: FAMILY MEDICINE

## 2018-10-15 RX ORDER — CALCIUM CARBONATE 300MG(750)
TABLET,CHEWABLE ORAL DAILY
COMMUNITY
End: 2019-08-29

## 2018-10-15 ASSESSMENT — ENCOUNTER SYMPTOMS
SORE THROAT: 0
SHORTNESS OF BREATH: 0
DYSURIA: 0
HEADACHES: 0
MYALGIAS: 0
DIARRHEA: 0
ARTHRALGIAS: 1
PARESTHESIAS: 0
DIZZINESS: 0
HEARTBURN: 0
NERVOUS/ANXIOUS: 0
NAUSEA: 0
EYE PAIN: 0
CONSTIPATION: 0
HEMATOCHEZIA: 0
CHILLS: 0
HEMATURIA: 0
WEAKNESS: 0
FEVER: 0
FREQUENCY: 0
COUGH: 1
JOINT SWELLING: 0
ABDOMINAL PAIN: 0
BREAST MASS: 0

## 2018-10-15 NOTE — MR AVS SNAPSHOT
After Visit Summary   10/15/2018    Mercedes Segal    MRN: [id number]           Patient Information     Date Of Birth          1970        Visit Information        Provider Department      10/15/2018 5:30 PM Sejal Garcia MD [facility]        Today's Diagnoses     Encounter for routine adult health examination without abnormal findings    -  1    History of gestational diabetes        Screen for STD (sexually transmitted disease)          Care Instructions      Preventive Health Recommendations  Female Ages 40 to 49    Yearly exam:     See your health care provider every year in order to  1. Review health changes.   2. Discuss preventive care.    3. Review your medicines if your doctor prescribed any.      Get a Pap test every three years (unless you have an abnormal result and your provider advises testing more often).      If you get Pap tests with HPV test, you only need to test every 5 years, unless you have an abnormal result. You do not need a Pap test if your uterus was removed (hysterectomy) and you have not had cancer.      You should be tested each year for STDs (sexually transmitted diseases), if you're at risk.     Ask your doctor if you should have a mammogram.      Have a colonoscopy (test for colon cancer) if someone in your family has had colon cancer or polyps before age 50.       Have a cholesterol test every 5 years.       Have a diabetes test (fasting glucose) after age 45. If you are at risk for diabetes, you should have this test every 3 years.    Shots: Get a flu shot each year. Get a tetanus shot every 10 years.     Nutrition:     Eat at least 5 servings of fruits and vegetables each day.    Eat whole-grain bread, whole-wheat pasta and brown rice instead of white grains and rice.    Get adequate Calcium and Vitamin D.      Lifestyle    Exercise at least 150 minutes a week (an average of 30 minutes a day, 5 days a week). This will help you control your  "weight and prevent disease.    Limit alcohol to one drink per day.    No smoking.     Wear sunscreen to prevent skin cancer.    See your dentist every six months for an exam and cleaning.          Follow-ups after your visit        Follow-up notes from your care team     Return in about 1 year (around 10/15/2019), or if symptoms worsen or fail to improve, for Routine Visit.      Who to contact     If you have questions or need follow up information about today's clinic visit or your schedule please contact [facility] directly at [phone].  Normal or non-critical lab and imaging results will be communicated to you by MyChart, letter or phone within 4 business days after the clinic has received the results. If you do not hear from us within 7 days, please contact the clinic through MyChart or phone. If you have a critical or abnormal lab result, we will notify you by phone as soon as possible.  Submit refill requests through [employer] or call your pharmacy and they will forward the refill request to us. Please allow 3 business days for your refill to be completed.          Additional Information About Your Visit        Care EveryWhere ID     This is your Care EveryWhere ID. This could be used by other organizations to access your [facility] medical records  [id number]        Your Vitals Were     Pulse Temperature Height Pulse Oximetry Breastfeeding? BMI (Body Mass Index)    64 98.1  F (36.7  C) (Temporal) 1.689 m (5' 6.5\") 97% No 26.87 kg/m2       Blood Pressure from Last 3 Encounters:   10/15/18 112/66   01/23/18 118/70   10/20/17 102/62    Weight from Last 3 Encounters:   10/15/18 76.7 kg (169 lb)   01/23/18 73.7 kg (162 lb 6.4 oz)   10/20/17 73 kg (161 lb)              We Performed the Following     Glucose     Hepatitis B surface antigen     Hepatitis C antibody     HIV Antigen Antibody Combo     Lipid panel reflex to direct LDL Non-fasting     Treponema Abs w Reflex to RPR and Titer        " Primary Care Provider Office Phone # Fax #    Sejal Garcia [phone]       [address] [place] [id number]        Equal Access to Services     JAKE MOORE : Hadii aad ku hadtomasjeffery Rajali, wamelida luqadaha, qaybta kamilada latha, dottie geejanine suarezjerrod gordongeovanny jacobs. So [facility] [phone].    ATENCIÓN: Si habla español, tiene a pro disposición servicios gratuitos de asistencia lingüística. Llame al [phone].    We comply with applicable federal civil rights laws and [place] laws. We do not discriminate on the basis of race, color, national origin, age, disability, sex, sexual orientation, or gender identity.            Thank you!     Thank you for choosing [facility]  for your care. Our goal is always to provide you with excellent care. Hearing back from our patients is one way we can continue to improve our services. Please take a few minutes to complete the written survey that you may receive in the mail after your visit with us. Thank you!             Your Updated Medication List - Protect others around you: Learn how to safely use, store and throw away your medicines at www.disposemymeds.org.          This list is accurate as of 10/15/18  6:16 PM.  Always use your most recent med list.                   Brand Name Dispense Instructions for use Diagnosis    CO Q 10 PO      Take by mouth daily        ibuprofen 800 MG tablet    ADVIL/MOTRIN    60 tablet    Take 1 tablet (800 mg) by mouth every 8 hours as needed for moderate pain For the next 1-2 weeks.    Rib pain on right side       Vitamin D3 1000 units Chew      Take by mouth daily

## 2018-10-16 LAB
CHOLEST SERPL-MCNC: 169 MG/DL
GLUCOSE SERPL-MCNC: 89 MG/DL (ref 70–99)
HDLC SERPL-MCNC: 45 MG/DL
LDLC SERPL CALC-MCNC: 105 MG/DL
NONHDLC SERPL-MCNC: 124 MG/DL
TRIGL SERPL-MCNC: 93 MG/DL

## 2018-10-17 ENCOUNTER — TELEPHONE (OUTPATIENT)
Dept: FAMILY MEDICINE | Facility: OTHER | Age: 48
End: 2018-10-17

## 2018-10-17 DIAGNOSIS — F17.200 TOBACCO USE DISORDER: Primary | ICD-10-CM

## 2018-10-17 LAB
HBV SURFACE AG SERPL QL IA: NONREACTIVE
HCV AB SERPL QL IA: NONREACTIVE
HIV 1+2 AB+HIV1 P24 AG SERPL QL IA: NONREACTIVE
T PALLIDUM AB SER QL: NONREACTIVE

## 2018-10-17 RX ORDER — NICOTINE 21 MG/24HR
1 PATCH, TRANSDERMAL 24 HOURS TRANSDERMAL EVERY 24 HOURS
Qty: 30 PATCH | Refills: 1 | Status: SHIPPED | OUTPATIENT
Start: 2018-10-17

## 2018-10-17 NOTE — TELEPHONE ENCOUNTER
Patient last seen by Dr. Garcia.  They did discuss smoking cessation.  Will send in Patches to pharmacy.      Edgardo Rob PA-C

## 2018-10-17 NOTE — TELEPHONE ENCOUNTER
Spoke to patient and relayed message. She is requesting a prescription for Nicotene patches, she states she talked to ES about this at her last visit.     ----- Message from Edgardo Rob PA-C sent at 10/17/2018 12:51 PM CDT -----  Please call patient  All STD screening is negative, glucose is normal.  Her LDL cholesterol is slightly elevated but improved from 2 years ago, encourage healthy lifestyle changes, low fat and carb in diet.       Edgardo Rob PA-C

## 2019-03-21 ENCOUNTER — ANCILLARY PROCEDURE (OUTPATIENT)
Dept: MAMMOGRAPHY | Facility: OTHER | Age: 49
End: 2019-03-21
Payer: COMMERCIAL

## 2019-03-21 DIAGNOSIS — Z12.31 VISIT FOR SCREENING MAMMOGRAM: ICD-10-CM

## 2019-03-21 PROCEDURE — 77067 SCR MAMMO BI INCL CAD: CPT | Mod: TC

## 2019-04-08 ENCOUNTER — TELEPHONE (OUTPATIENT)
Dept: OBGYN | Facility: CLINIC | Age: 49
End: 2019-04-08

## 2019-04-08 NOTE — TELEPHONE ENCOUNTER
Reason for call:  Patient reporting a symptom    Symptom or request: Premenopausal symptoms-Hot flashes    Duration (how long have symptoms been present):  On and off    Have you been treated for this before? No    Additional comments: She is wondering if Dr. Grider would recommend an over-the-counter medication to help with hot flashes.  She is coming in on 05/09/2019 for Pap    Phone Number patient can be reached at:  Work number on file:  [phone] (work)    Best Time:  anytime    Can we leave a detailed message on this number:  YES    Call taken on 4/8/2019 at 12:39 PM by Dorys Lopez

## 2019-04-08 NOTE — TELEPHONE ENCOUNTER
Spoke with pt.  She states she thinks she is going through menopause and experiencing hot flashes.  She is wondering if there is anything she can take over the counter to help with the hot flashes.  I gave her the information below.  I will also route to Dr. Grider for further recommendations.  I did let pt know that Dr. Grider is gone for the next few days.  Pt is okay with waiting for a response from Dr. Grider.    Kristi Parks RN    How are hot flashes treated?  Your doctor may suggest medicine to control the hot flashes. Vitamins E, B6 or C may also help. Talk to your doctor about how much to take.  Some herbs or foods may help: primrose oil, garlic, chamomile, ginseng root, royal jelly or soy.  What else can I do to cope with hot flashes?    Wear clothes made of natural fabric such as cotton.    Dress in layers. You can remove them when you feel too warm.    Avoid hot drinks (coffee or tea) and spicy foods.    Keep the room temperature low if you can.    Control your stress. Exercise and relaxation techniques may help.    Keep track of when and how often hot flashes occur. Note what is happening right before a hot flash. This may give you some control over future hot flashes.

## 2019-04-11 NOTE — TELEPHONE ENCOUNTER
Patient can try over the counter Vitamin E or Black Cohosh.  She should follow up in the office if the signs and symptoms do not improve.      Zoila Grider

## 2019-05-09 ENCOUNTER — OFFICE VISIT (OUTPATIENT)
Dept: OBGYN | Facility: OTHER | Age: 49
End: 2019-05-09
Payer: COMMERCIAL

## 2019-05-09 VITALS
BODY MASS INDEX: 26.87 KG/M2 | SYSTOLIC BLOOD PRESSURE: 100 MMHG | HEART RATE: 78 BPM | WEIGHT: 169 LBS | DIASTOLIC BLOOD PRESSURE: 70 MMHG

## 2019-05-09 DIAGNOSIS — Z72.0 TOBACCO ABUSE: ICD-10-CM

## 2019-05-09 DIAGNOSIS — N95.1 MENOPAUSAL SYNDROME (HOT FLASHES): ICD-10-CM

## 2019-05-09 DIAGNOSIS — Z12.4 CERVICAL CANCER SCREENING: Primary | ICD-10-CM

## 2019-05-09 PROCEDURE — G0145 SCR C/V CYTO,THINLAYER,RESCR: HCPCS | Performed by: OBSTETRICS & GYNECOLOGY

## 2019-05-09 PROCEDURE — 99213 OFFICE O/P EST LOW 20 MIN: CPT | Performed by: OBSTETRICS & GYNECOLOGY

## 2019-05-09 PROCEDURE — 87624 HPV HI-RISK TYP POOLED RSLT: CPT | Performed by: OBSTETRICS & GYNECOLOGY

## 2019-05-09 RX ORDER — PAROXETINE 10 MG/1
TABLET, FILM COATED ORAL
Qty: 60 TABLET | Refills: 1 | Status: SHIPPED | OUTPATIENT
Start: 2019-05-09 | End: 2019-10-09

## 2019-05-09 RX ORDER — PAROXETINE 7.5 MG/1
7.5 CAPSULE ORAL DAILY
Qty: 90 CAPSULE | Refills: 1 | Status: SHIPPED | OUTPATIENT
Start: 2019-05-09 | End: 2019-08-29

## 2019-05-09 RX ORDER — MULTIPLE VITAMINS W/ MINERALS TAB 9MG-400MCG
1 TAB ORAL DAILY
COMMUNITY

## 2019-05-09 NOTE — LETTER
May 17, 2019    Mercedes Segal  [address] [facility] [address]    Dear ,  This letter is regarding your recent Pap smear (cervical cancer screening) and Human Papillomavirus (HPV) test.  We are happy to inform you that your Pap smear result is normal. Cervical cancer is closely linked with certain types of HPV. Your results showed no evidence of high-risk HPV.  Therefore we recommend you return in 5 years for your next pap smear and HPV test.  You will still need to return to the clinic every year for an annual exam and other preventive tests.  If you have additional questions regarding this result, please call our registered nurse, Noa at [phone].  Sincerely,    Zoila Grider DO/mallorie

## 2019-05-09 NOTE — PROGRESS NOTES
Subjective  49 year old non-pregnant female presents today for a pap only visit.  Patient had an annual exam last Oct.  Patient hasn't had a menses since Feb.  She has been having hot flashes.  Patient states she is waking up all night long because of the hot flashes.  2 children.  No problems urinating.  Some irregular bowel movements since having her gallbladder removed.  She is wanting something for hot flashes.  She does smoke a pack a day.  No family history of blood clots or stroke.  She is not sexually active.  We discussed treatment options for her hot flashes.  Will hold off on HRT due to tobacco abuse.  Will try Paxil.        ROS: 10 point ROS neg other than the symptoms noted above in the HPI.  Past Medical History:   Diagnosis Date     Tobacco use disorder      Past Surgical History:   Procedure Laterality Date      SECTION       CHOLECYSTECTOMY  2017     DILATION AND CURETTAGE       HC TOOTH EXTRACTION W/FORCEP      [place] Teeth      TONSILLECTOMY       TUBAL LIGATION       Family History   Problem Relation Age of Onset     Other - See Comments Mother         Schizophrenia     Leukemia Mother         CLL     Osteoporosis Mother      Cancer Father         Lung Cancer     Cancer Brother         Leukemia     Asthma Other         nephew     Thyroid Disease Other         niece     Hypertension Maternal Aunt      Social History     Tobacco Use     Smoking status: Current Every Day Smoker     Packs/day: 1.00     Years: 20.00     Pack years: 20.00     Types: Cigarettes     Smokeless tobacco: Never Used   Substance Use Topics     Alcohol use: No         Objective  Vitals: /70   Pulse 78   Wt 76.7 kg (169 lb)   LMP 02/15/2019   BMI 26.87 kg/m    BMI= Body mass index is 26.87 kg/m .    General appearance=well developed, well-nourished female  Psych=mood is stable  PELVIC:    External genitalia: normal without lesions or masses  Urethral meatus: no lesions or prolapse noted, normal  size  Urethra: no masses, non tender  Bladder: non tender, no fullness  Vagina: normal mucosa and rugae, no discharge.  Cervix: normal without lesion, no cervical motion tenderness, healthy, multiparous, pap smear obtained  Uterus: small, mobile, nontender.  Adnexa: non tender, without masses  Rectal: deffered  Ext=no clubbing or cyanosis, no swelling      Assessment  1.)  Cervical cancer screening  2.)  Hot flashes  3.)  Tobacco abuse      Plan  1.)  Pap smear  2.)  Paroxetine ordered  3.)  Follow-up in 3 months      25 minutes was spent face to face with the patient today discussing her history, diagnosis, and follow-up plan as noted above.  Over 50% of the visit was spent in counseling and coordination of care.        Nursing notes read and reviewed    Zoila Grider

## 2019-05-09 NOTE — PATIENT INSTRUCTIONS
If you have any questions regarding your visit, Please contact your care team.    Women s Health CLINIC HOURS TELEPHONE NUMBER   Zoila Grider M.D.    Tracy Christie -         Monday-[facility]  7:00 am - 5 pm Monday's Tuesday- [facility]  8:00am- 5 pm  Wednesday- Off  Thursday- Offf Friday-Am [place], and [facility]  [place] 8:00-11:30 AM  [place] 1:00-5:00 PM [facility]  [address] [place] [address]  [phone] ask for [facility]    Imaging [phone]    [facility]  [address] [facility]  [place], [place] [address] [phone]  Imaging [phone]    [facility]  [address] [place], [place] [id number] [phone]  Imaging Scheduling - [phone]     Urgent Care locations:    [facility] Saturday and Sunday   9 am - 5 pm    Monday-Friday   12 pm - 8 pm  Saturday and Sunday   9 am - 5 pm   [phone]       If you need a medication refill, please contact your pharmacy. Please allow 3 business days for your refill to be completed.  As always, Thank you for trusting us with your healthcare needs!

## 2019-05-13 LAB
COPATH REPORT: NORMAL
PAP: NORMAL

## 2019-05-15 LAB
FINAL DIAGNOSIS: NORMAL
HPV HR 12 DNA CVX QL NAA+PROBE: NEGATIVE
HPV16 DNA SPEC QL NAA+PROBE: NEGATIVE
HPV18 DNA SPEC QL NAA+PROBE: NEGATIVE
SPECIMEN DESCRIPTION: NORMAL
SPECIMEN SOURCE CVX/VAG CYTO: NORMAL

## 2019-08-15 ENCOUNTER — TRANSFERRED RECORDS (OUTPATIENT)
Dept: HEALTH INFORMATION MANAGEMENT | Facility: CLINIC | Age: 49
End: 2019-08-15

## 2019-08-28 NOTE — PROGRESS NOTES
SUBJECTIVE:   CC: Mercedes Segal is an 49 year old woman who presents for preventive health visit.     Healthy Habits:     Getting at least 3 servings of Calcium per day:  NO    Bi-annual eye exam:  Yes    Dental care twice a year:  Yes    Sleep apnea or symptoms of sleep apnea:  Daytime drowsiness    Diet:  Regular (no restrictions)    Frequency of exercise:  2-3 days/week    Duration of exercise:  15-30 minutes    Taking medications regularly:  Yes    Medication side effects:  None    PHQ-2 Total Score: 0    Additional concerns today:  Yes          Dizziness/Vertigo   Onset: 2 weeks     Description:   Do you feel faint:  no   Does it feel like the surroundings (bed, room) are moving: Yes 2 weeks ago  Unsteady/off balance: no   Have you passed out or fallen: no     Intensity: mild, moderate    Progression of Symptoms:  same, constant and intermittent    Accompanying Signs & Symptoms:  Heart palpitations: no   Nausea, vomiting: YES 2 weeks ago  Weakness in arms or legs: no   Fatigue: YES  Vision or speech changes: YES  Ringing in ears (Tinnitus): no   Hearing Loss: no     History:   Head trauma/concussion hx: no   Previous similar symptoms: no   Recent bleeding history: no     Precipitating factors:   Worse with activity or head movement: YES  Any new medications (BP?): no   Alcohol/drug abuse/withdrawal: no     Alleviating factors:   Does staying in a fixed position give relief:  YES/Closes eyes     Therapies Tried and outcome: Went to ER on 8-15 was given Meclizine and states this did not help.     Had spinning sensation initially and the exercises done there have helped. [facility] and have not had further spinning, but feels off balance now.    Today's PHQ-2 Score:   PHQ-2 ( 1999 [employer]) 8/29/2019   Q1: Little interest or pleasure in doing things 0   Q2: Feeling down, depressed or hopeless 0   PHQ-2 Score 0   Q1: Little interest or pleasure in doing things Not at all   Q2:  Feeling down, depressed or hopeless Not at all   PHQ-2 Score 0       Abuse: Current or Past(Physical, Sexual or Emotional)- No  Do you feel safe in your environment? Yes    Social History     Tobacco Use     Smoking status: Current Every Day Smoker     Packs/day: 1.00     Years: 20.00     Pack years: 20.00     Types: Cigarettes     Smokeless tobacco: Never Used   Substance Use Topics     Alcohol use: No         Alcohol Use 8/29/2019   Prescreen: >3 drinks/day or >7 drinks/week? No   Prescreen: >3 drinks/day or >7 drinks/week? -   AUDIT SCORE  -       Reviewed orders with patient.  Reviewed health maintenance and updated orders accordingly - Yes  Lab work is in process    Mammogram Screening: Patient under age 50, mutual decision reflected in health maintenance.      Pertinent mammograms are reviewed under the imaging tab.  History of abnormal Pap smear: NO - age 30-65 PAP every 5 years with negative HPV co-testing recommended  PAP / HPV Latest Ref Rng & Units 5/9/2019 5/23/2016 8/22/2012   PAP - NIL NIL NIL   HPV 16 DNA NEG:Negative Negative Negative -   HPV 18 DNA NEG:Negative Negative Negative -   OTHER HR HPV NEG:Negative Negative Negative -     Reviewed and updated as needed this visit by clinical staff  Tobacco  Allergies  Meds  Problems  Med Hx  Surg Hx  Fam Hx         Reviewed and updated as needed this visit by Provider  Tobacco  Allergies  Meds  Problems  Med Hx  Surg Hx  Fam Hx            Review of Systems   Constitutional: Negative for chills and fever.   HENT: Negative for congestion, ear pain, hearing loss and sore throat.    Eyes: Negative for pain and visual disturbance.   Respiratory: Negative for cough and shortness of breath.    Cardiovascular: Negative for chest pain, palpitations and peripheral edema.   Gastrointestinal: Positive for diarrhea. Negative for abdominal pain, constipation, heartburn, hematochezia and nausea.   Genitourinary: Negative for dysuria, frequency, genital  "sores, hematuria and urgency.   Musculoskeletal: Negative for arthralgias, joint swelling and myalgias.   Skin: Negative for rash.   Neurological: Positive for dizziness. Negative for weakness, headaches and paresthesias.   Psychiatric/Behavioral: Negative for mood changes. The patient is not nervous/anxious.           OBJECTIVE:   /70 (BP Location: Left arm, Patient Position: Chair, Cuff Size: Adult Regular)   Pulse 70   Temp 98.5  F (36.9  C) (Temporal)   Resp 16   Ht 1.702 m (5' 7\")   Wt 75.5 kg (166 lb 6.4 oz)   LMP 02/19/2019 (Approximate)   SpO2 98%   Breastfeeding? No   BMI 26.06 kg/m    Physical Exam  GENERAL: healthy, alert and no distress  EYES: Eyes grossly normal to inspection, PERRL and conjunctivae and sclerae normal. Noted blurry vision in the R eye on 14 inch vision testing.  HENT: ear canals and TM's normal, nose and mouth without ulcers or lesions  NECK: no adenopathy, no asymmetry, masses, or scars and thyroid normal to palpation  RESP: lungs clear to auscultation - no rales, rhonchi or wheezes  CV: regular rate and rhythm, normal S1 S2, no S3 or S4, no murmur, click or rub, no peripheral edema and peripheral pulses strong  ABDOMEN: soft, nontender, no hepatosplenomegaly, no masses and bowel sounds normal  MS: no gross musculoskeletal defects noted, no edema  SKIN: no suspicious lesions or rashes  NEURO: Normal strength and tone, mentation intact and speech normal  PSYCH: mentation appears normal, affect normal/bright  Declined breast and pelvic as done with her ob/gyn      ASSESSMENT/PLAN:       ICD-10-CM    1. Routine general medical examination at a health care facility Z00.00 Lipid panel reflex to direct LDL Fasting   2. Tobacco abuse Z72.0    3. Menopausal syndrome (hot flashes) N95.1    4. History of gestational diabetes Z86.32    5. Vertigo R42 TSH with free T4 reflex     Hepatic panel (Albumin, ALT, AST, Bili, Alk Phos, TP)   6. Chronic fatigue R53.82 TSH with free T4 reflex " "    Hepatic panel (Albumin, ALT, AST, Bili, Alk Phos, TP)     Discussed with her vision in her L eye blurry and she wears contacts, that we should start by troubleshooting this. Make sure her prescription is correct as she gets disposable and can check in with eye provider as well -- she had recent exam and no change in prescription she said, but has been getting off balance since new shipment.  Labs ordered today for completeness. Discussed MRI and feel given her symptoms are intermittently better and worse, it is unlikely to be a tumor. Though carotid US could be considered for completeness, working on the vision first may help.    COUNSELING:  Reviewed preventive health counseling, as reflected in patient instructions       Regular exercise       Healthy diet/nutrition       Vision screening    Estimated body mass index is 26.06 kg/m  as calculated from the following:    Height as of this encounter: 1.702 m (5' 7\").    Weight as of this encounter: 75.5 kg (166 lb 6.4 oz).    Weight management plan: Discussed healthy diet and exercise guidelines     reports that she has been smoking cigarettes.  She has a 20.00 pack-year smoking history. She has never used smokeless tobacco.  Tobacco Cessation Action Plan: Information offered: Patient not interested at this time    Counseling Resources:  ATP IV Guidelines  Pooled Cohorts Equation Calculator  Breast Cancer Risk Calculator  FRAX Risk Assessment  ICSI Preventive Guidelines  Dietary Guidelines for Americans, 2010  USDA's MyPlate  ASA Prophylaxis  Lung CA Screening    Sejal Garcia MD, MD  [facility]  "

## 2019-08-29 ENCOUNTER — OFFICE VISIT (OUTPATIENT)
Dept: FAMILY MEDICINE | Facility: OTHER | Age: 49
End: 2019-08-29
Payer: COMMERCIAL

## 2019-08-29 VITALS
SYSTOLIC BLOOD PRESSURE: 118 MMHG | WEIGHT: 166.4 LBS | DIASTOLIC BLOOD PRESSURE: 70 MMHG | HEART RATE: 70 BPM | HEIGHT: 67 IN | TEMPERATURE: 98.5 F | BODY MASS INDEX: 26.12 KG/M2 | OXYGEN SATURATION: 98 % | RESPIRATION RATE: 16 BRPM

## 2019-08-29 DIAGNOSIS — R53.82 CHRONIC FATIGUE: ICD-10-CM

## 2019-08-29 DIAGNOSIS — Z00.00 ROUTINE GENERAL MEDICAL EXAMINATION AT A HEALTH CARE FACILITY: Primary | ICD-10-CM

## 2019-08-29 DIAGNOSIS — N95.1 MENOPAUSAL SYNDROME (HOT FLASHES): ICD-10-CM

## 2019-08-29 DIAGNOSIS — R42 VERTIGO: ICD-10-CM

## 2019-08-29 DIAGNOSIS — Z72.0 TOBACCO ABUSE: ICD-10-CM

## 2019-08-29 DIAGNOSIS — Z86.32 HISTORY OF GESTATIONAL DIABETES: ICD-10-CM

## 2019-08-29 LAB
ALBUMIN SERPL-MCNC: 3.8 G/DL (ref 3.4–5)
ALP SERPL-CCNC: 69 U/L (ref 40–150)
ALT SERPL W P-5'-P-CCNC: 25 U/L (ref 0–50)
AST SERPL W P-5'-P-CCNC: 16 U/L (ref 0–45)
BILIRUB DIRECT SERPL-MCNC: 0.1 MG/DL (ref 0–0.2)
BILIRUB SERPL-MCNC: 0.4 MG/DL (ref 0.2–1.3)
CHOLEST SERPL-MCNC: 189 MG/DL
HDLC SERPL-MCNC: 52 MG/DL
LDLC SERPL CALC-MCNC: 118 MG/DL
NONHDLC SERPL-MCNC: 137 MG/DL
PROT SERPL-MCNC: 7.1 G/DL (ref 6.8–8.8)
TRIGL SERPL-MCNC: 93 MG/DL
TSH SERPL DL<=0.005 MIU/L-ACNC: 1.44 MU/L (ref 0.4–4)

## 2019-08-29 PROCEDURE — 84443 ASSAY THYROID STIM HORMONE: CPT | Performed by: FAMILY MEDICINE

## 2019-08-29 PROCEDURE — 99213 OFFICE O/P EST LOW 20 MIN: CPT | Mod: 25 | Performed by: FAMILY MEDICINE

## 2019-08-29 PROCEDURE — 80061 LIPID PANEL: CPT | Performed by: FAMILY MEDICINE

## 2019-08-29 PROCEDURE — 99396 PREV VISIT EST AGE 40-64: CPT | Performed by: FAMILY MEDICINE

## 2019-08-29 PROCEDURE — 80076 HEPATIC FUNCTION PANEL: CPT | Performed by: FAMILY MEDICINE

## 2019-08-29 PROCEDURE — 36415 COLL VENOUS BLD VENIPUNCTURE: CPT | Performed by: FAMILY MEDICINE

## 2019-08-29 ASSESSMENT — ENCOUNTER SYMPTOMS
SORE THROAT: 0
JOINT SWELLING: 0
DYSURIA: 0
DIARRHEA: 1
ARTHRALGIAS: 0
FREQUENCY: 0
WEAKNESS: 0
EYE PAIN: 0
CHILLS: 0
SHORTNESS OF BREATH: 0
DIZZINESS: 1
CONSTIPATION: 0
HEMATOCHEZIA: 0
HEADACHES: 0
PALPITATIONS: 0
FEVER: 0
MYALGIAS: 0
NERVOUS/ANXIOUS: 0
PARESTHESIAS: 0
HEMATURIA: 0
HEARTBURN: 0
COUGH: 0
ABDOMINAL PAIN: 0
NAUSEA: 0

## 2019-08-29 ASSESSMENT — MIFFLIN-ST. JEOR: SCORE: 1417.42

## 2019-08-29 NOTE — LETTER
August 29, 2019      Mercedes Segal  [address] [facility] [address]        Dear Mercedes,     This letter is to inform you that Dr. Garcia states that your labs generally look good, but a little higher for the cholesterol.     Results for orders placed or performed in visit on 08/29/19   Lipid panel reflex to direct LDL Fasting   Result Value Ref Range    Cholesterol 189 <200 mg/dL    Triglycerides 93 <150 mg/dL    HDL Cholesterol 52 >49 mg/dL    LDL Cholesterol Calculated 118 (H) <100 mg/dL    Non HDL Cholesterol 137 (H) <130 mg/dL   TSH with free T4 reflex   Result Value Ref Range    TSH 1.44 0.40 - 4.00 mU/L   Hepatic panel (Albumin, ALT, AST, Bili, Alk Phos, TP)   Result Value Ref Range    Bilirubin Direct 0.1 0.0 - 0.2 mg/dL    Bilirubin Total 0.4 0.2 - 1.3 mg/dL    Albumin 3.8 3.4 - 5.0 g/dL    Protein Total 7.1 6.8 - 8.8 g/dL    Alkaline Phosphatase 69 40 - 150 U/L    ALT 25 0 - 50 U/L    AST 16 0 - 45 U/L       Please let us know if you have any further questions or concerns.    Thanks,  [facility] Team

## 2019-08-30 ENCOUNTER — TELEPHONE (OUTPATIENT)
Dept: FAMILY MEDICINE | Facility: OTHER | Age: 49
End: 2019-08-30

## 2019-08-30 NOTE — TELEPHONE ENCOUNTER
Reason for Call:  Request for results:    Name of test or procedure: Thyroid, blood tests    Date of test of procedure: 08/29/19    Location of the test or procedure: ER    OK to leave the result message on voice mail or with a family member? YES    Phone number Patient can be reached at:  Cell number on file:    Telephone Information:   Mobile [phone]       Additional comments: Patient is wondering her results from yesterdays tests    Call taken on 8/30/2019 at 4:01 PM by Kimberley Garcia

## 2019-08-30 NOTE — TELEPHONE ENCOUNTER
"Please call patient - per lab result note: \"Notes recorded by Sejal Garcia MD on 8/29/2019 at 2:13 PM CDT  Labs generally look good, but a little higher for the cholesterol.  Sejal Garcia MD\"    "

## 2019-09-03 ENCOUNTER — TELEPHONE (OUTPATIENT)
Dept: FAMILY MEDICINE | Facility: OTHER | Age: 49
End: 2019-09-03

## 2019-09-03 DIAGNOSIS — R42 VERTIGO: Primary | ICD-10-CM

## 2019-09-03 NOTE — TELEPHONE ENCOUNTER
Called and spoke with patient regarding message below.  Patient stated she forgot to mention to AE that she has one contact in that is for near sighted and one that is for far sighted.  Patient states called balance contact- one will more blurry then the other.  Patient would like to have orders placed for the imaging and get that completed.  Amanda Méndez CMA ([facility])

## 2019-09-03 NOTE — TELEPHONE ENCOUNTER
Reason for Call: Request for an order or referral:    Order or referral being requested: Neck Scan    Date needed: at your convenience    Has the patient been seen by the PCP for this problem? YES    Additional comments: Patient received her blood work results and stated she needs AE to place an order for a scan of her neck. She was not able to recall what kind of scan exactly but that they had discussed it at her last appointment.    Phone number Patient can be reached at:  Work number on file:  [phone] (work)    Best Time:  any    Can we leave a detailed message on this number?  No    Call taken on 9/3/2019 at 8:46 AM by Maria Guadalupe Schmidt

## 2019-09-03 NOTE — TELEPHONE ENCOUNTER
Per last provider note it appears that she wanted her to check in with her eye doctor and make sure that her prescription is correct, please verify if she has done this.      She did recommend MRI and carotid ultrasound for a complete work-up but recommended working on vision first as she had less suspicion for this being the concern.     Edgardo Rob PA-C

## 2019-09-03 NOTE — TELEPHONE ENCOUNTER
Attempted work number listed but did not leave a message, tried mobile number listed and LM to return call, see below and assist with scheduling.   [place]: [phone]  [place]: [phone]    Laxmi Fernandes MA

## 2019-09-03 NOTE — TELEPHONE ENCOUNTER
Patient returned call and was given message below. She stated she does not need to be called back unless there is anything she should be doing to help her cholesterol levels.

## 2019-09-03 NOTE — TELEPHONE ENCOUNTER
Office visit with Dr. GOLDBERG on 8/29/19   Discussed with her vision in her L eye blurry and she wears contacts, that we should start by troubleshooting this. Make sure her prescription is correct as she gets disposable and can check in with eye provider as well -- she had recent exam and no change in prescription she said, but has been getting off balance since new shipment.  Labs ordered today for completeness. Discussed MRI and feel given her symptoms are intermittently better and worse, it is unlikely to be a tumor. Though carotid US could be considered for completeness, working on the vision first may help.

## 2019-09-06 ENCOUNTER — TELEPHONE (OUTPATIENT)
Dept: FAMILY MEDICINE | Facility: OTHER | Age: 49
End: 2019-09-06

## 2019-09-06 ENCOUNTER — HOSPITAL ENCOUNTER (OUTPATIENT)
Dept: MRI IMAGING | Facility: CLINIC | Age: 49
Discharge: HOME | End: 2019-09-06
Attending: PHYSICIAN ASSISTANT | Admitting: PHYSICIAN ASSISTANT
Payer: COMMERCIAL

## 2019-09-06 DIAGNOSIS — R42 VERTIGO: ICD-10-CM

## 2019-09-06 PROCEDURE — 70551 MRI BRAIN STEM W/O DYE: CPT

## 2019-09-06 NOTE — TELEPHONE ENCOUNTER
Spoke to spouse c2c on file and informed of results- He is going to relay to Mercedes if they just want to go ahead and schedule with neurology or wait for DR. GOLDBERG next week opinion

## 2019-09-06 NOTE — TELEPHONE ENCOUNTER
----- Message from Lucy Aguilar MD sent at 9/6/2019 12:45 PM CDT -----  AE patient.  MRI done for vertigo and blurry vision.  NO tumor was found.  She does have some nonspecific white matter changes and unclear if this is related to her symptoms or not.  Can offer Neurology consult or she can wait and discuss with AE next week.    Electronically signed by Lucy Aguilar MD on 9/6/2019

## 2019-09-09 ENCOUNTER — TELEPHONE (OUTPATIENT)
Dept: FAMILY MEDICINE | Facility: OTHER | Age: 49
End: 2019-09-09

## 2019-09-09 DIAGNOSIS — R42 VERTIGO: Primary | ICD-10-CM

## 2019-09-09 NOTE — TELEPHONE ENCOUNTER
"This is non-specific. Normally seen with those with hypertension or migraines. Though can also be seen with patients with demyelinating diseases such as MS. I didn't think that her exam seemed consistent with the demyelinating disease, but if she would like a neurologist who specializes in these can complete the exam to review as the image can be \"non-specific\" and does not give us the diagnosis, so it needs to be determined by exam and history.Sejal Garcia MD    "

## 2019-09-09 NOTE — TELEPHONE ENCOUNTER
Plan was to fix her contacts, then consider carotid US.  Order in to schedule carotid US.  Sejal Garcia MD

## 2019-09-09 NOTE — TELEPHONE ENCOUNTER
Contacted patient, relayed message below and she was scheduled for carotid ultrasound but she is wondering what the nonspecific cerebral white matter changes means on her MRI results?

## 2019-09-09 NOTE — TELEPHONE ENCOUNTER
Reason for Call:  Request for results:    Name of test or procedure: MRI    Date of test of procedure: 8-29    Location of the test or procedure: [facility] to leave the result message on voice mail or with a family member? no    Phone number Patient can be reached at:   [phone]    Additional comments: call with results.    Call taken on 9/9/2019 at 10:40 AM by Marylou George

## 2019-09-09 NOTE — TELEPHONE ENCOUNTER
AE please advise. Patient is at work tonight until 6pm so we would need to call that number with your thoughts as to what you'd like her to do.  Laxmi Fernandes MA

## 2019-09-10 NOTE — TELEPHONE ENCOUNTER
Patient called and message below relayed. Patient states that she would like a referral to see the neurologist

## 2019-09-11 ENCOUNTER — TELEPHONE (OUTPATIENT)
Dept: FAMILY MEDICINE | Facility: OTHER | Age: 49
End: 2019-09-11

## 2019-09-11 ENCOUNTER — ANCILLARY PROCEDURE (OUTPATIENT)
Dept: ULTRASOUND IMAGING | Facility: OTHER | Age: 49
End: 2019-09-11
Attending: FAMILY MEDICINE
Payer: COMMERCIAL

## 2019-09-11 DIAGNOSIS — R42 VERTIGO: ICD-10-CM

## 2019-09-11 PROCEDURE — 93880 EXTRACRANIAL BILAT STUDY: CPT

## 2019-09-11 NOTE — TELEPHONE ENCOUNTER
Spoke with Gini - we just needed to route this to AE. Neurology referral pended.   Daisy Kent, CMA

## 2019-09-11 NOTE — TELEPHONE ENCOUNTER
Left message for patient to return call. Please give her AE message.    Your provider has referred you for the following:   Consult at [facility]: [facility] [phone]     Kailey Chavez CMA

## 2019-09-11 NOTE — TELEPHONE ENCOUNTER
----- Message from Sejal Garcia MD sent at 9/11/2019 10:12 AM CDT -----  Carotid US looks good.   Sejal Garcia MD

## 2019-09-12 NOTE — TELEPHONE ENCOUNTER
Spoke to patient and relayed message. She wanted to let AE know that she has her neurology appointment on 10/9/19. She was also wondering exactly where on her brain the non-specific white matter is  Kailey Chavez CMA

## 2019-09-12 NOTE — TELEPHONE ENCOUNTER
Not sure how much info will make sense for her - so the overview is that there are a few throughout. Detailed report: a few tiny scattered focal areas in the subcortical white matter of the cerebral hemispheres bilaterally.   Sejal Garcia MD

## 2019-10-09 ENCOUNTER — OFFICE VISIT (OUTPATIENT)
Dept: NEUROLOGY | Facility: CLINIC | Age: 49
End: 2019-10-09
Attending: FAMILY MEDICINE
Payer: COMMERCIAL

## 2019-10-09 VITALS
WEIGHT: 168.5 LBS | OXYGEN SATURATION: 98 % | SYSTOLIC BLOOD PRESSURE: 123 MMHG | HEIGHT: 67 IN | BODY MASS INDEX: 26.45 KG/M2 | HEART RATE: 83 BPM | DIASTOLIC BLOOD PRESSURE: 77 MMHG

## 2019-10-09 DIAGNOSIS — G45.0 VERTEBRAL BASILAR INSUFFICIENCY: ICD-10-CM

## 2019-10-09 DIAGNOSIS — R42 VERTIGO: Primary | ICD-10-CM

## 2019-10-09 DIAGNOSIS — M54.2 NECK PAIN: ICD-10-CM

## 2019-10-09 PROCEDURE — 99203 OFFICE O/P NEW LOW 30 MIN: CPT | Performed by: PSYCHIATRY & NEUROLOGY

## 2019-10-09 ASSESSMENT — PAIN SCALES - GENERAL: PAINLEVEL: NO PAIN (0)

## 2019-10-09 ASSESSMENT — MIFFLIN-ST. JEOR: SCORE: 1426.94

## 2019-10-09 NOTE — LETTER
10/9/2019         RE: Mercedes Segal  [address] [facility] [address]        Dear Colleague,    Thank you for referring your patient, Mercedes Segal, to the [facility]. Please see a copy of my visit note below.    Visit Date:   10/09/2019      PRIMARY CARE PHYSICIAN:  Sejal Garcia MD.        Mercedes Segal is a 49-year-old female referred by Dr. Garcia for further evaluation of vertigo and lightheadedness.      HISTORY OF PRESENT ILLNESS:  On 08/15/2019 she got up out of bed.  The room was spinning.  She felt nauseated.  She felt unbalanced.  She did have an evaluation in the emergency room on that day and her examination was relatively unremarkable.  Laboratory work done in the emergency room included a normal basic metabolic panel, troponin, CBC.  She subsequently also had a lipid panel checked and her LDL cholesterol was 118.  TSH was normal.  Hepatic panel normal.  No imaging was done initially.      She subsequently had a brain MRI scan done on 09/06/2019 without contrast.  There were noted a few scattered areas of T2 signal hyperintensity in the subcortical white matter of the cerebral hemispheres.  These are small.  There was no abnormality on the diffusion scan.  There were no abnormalities in the cerebellum or brainstem and no posterior fossa lesion.  She also had a carotid ultrasound done that was negative in terms of carotid stenosis and there was anterograde flow in the vertebral arteries.      Following the acute event, the patient felt foggy in the head for about a week and was not thinking clearly.  Her sister who accompanies her today also indicated her speech sounded hollow for 3 days.  For a while,  she tended to move slowly.  She felt a quick movement might trigger the vertigo again.  She now feels lightheaded on occasion, but this is not related to any particular position of her head.  She has had no double vision or change in her hearing.      She does indicate the day  before she developed the acute vertigo she had pain in her neck, but this resolved.  She has had no recent head or neck injury.      She has no history of migraine.      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      PAST MEDICAL HISTORY:  Notable for gestational diabetes and cholecystectomy.      CURRENT MEDICATIONS:   1.  Advil.   2.  Coenzyme   3.  Multivitamins.      ALLERGIES:  SHE IS ALLERGIC TO PENICILLIN.      FAMILY HISTORY:  Notable for a niece with hemiplegic migraine and a sister with epilepsy.      SOCIAL HISTORY:  The patient does smoke.  She occasionally uses alcohol.      PHYSICAL EXAMINATION:   GENERAL:  Reveals the patient is alert and cooperative.   VITAL SIGNS:  Heart rate 83.  Blood pressure 123/77. Cervical bruits not appreciated.  The Hallpike maneuver is negative.    HEENT:  Pupils are equal, round and react well to light.   NEUROLOGIC:  Funduscopic examination is unremarkable.  She has full extraocular motility without nystagmus.  Facial strength and sensation are normal.  Her speech is clear.  Palate moves in the midline.  Motor examination reveals normal strength.  Sensory examination is intact to position and vibration.  Finger-to-nose and heel-to-shin are done well.  She is able to tandem walk without difficulty. Reflexes are 2+.  Plantar responses are flexor.      Hearing is intact to bedside testing.      Hallpike maneuver is negative.      IMPRESSION:   1.  Acute vertigo on 08/15/2019.   2.  Report of changes in speech and foggy headedness following the event.   3.  Episodes of lightheadedness.   4.  Transient neck pain.   5.  MRI scan demonstrating nonspecific white matter lesions, likely incidental.      PLAN:  I did review the brain MRI scan with the patient and her sister.  The white matter lesions are nonspecific and not clearly relevant to her presenting symptoms.  I did  discuss how these often are of uncertain etiology, but in her case I did encourage her to stop smoking as they can be seen in the small vessel degeneration.      I do want to take a closer look at her posterior circulation, particularly in view of the neck pain that she experienced prior to the event.  I want to make certain we are not dealing with any abnormality in the vertebrobasilar system.  She is going to have MR angiograms of the head and neck for this purpose and I will contact her with the results.      Her residual lightheadedness could represent some residual vestibular dysfunction, although this is not evident on examination today.  She is considering whether or not to pursue formal vestibular testing which was going to be done at the [facility].  If she was to do this, she would prefer to have it done at the [facility].      I will be contacting her with the results of the MR angiographic studies and at that time  she will tell me if she wishes to pursue the formal vestibular testing at the [facility].         CARLITOS CISNEROS MD             D: 10/09/2019   T: 10/09/2019   MT:       Name:     GISELLE ANDREW   MRN:      [id number]        Account:      [id number]   :      1970           Visit Date:   10/09/2019      Document: [id number]       cc: Sejal Garcia MD       Again, thank you for allowing me to participate in the care of your patient.        Sincerely,        Carlitos Cisneros MD

## 2019-10-09 NOTE — NURSING NOTE
"Mercedes Segal's goals for this visit include: consult  She requests these members of her care team be copied on today's visit information:     PCP: Sejal Garcia    Referring Provider:  Sejal Garcia MD  [address] [place], [place] [id number]    /77   Pulse 83   Ht 1.702 m (5' 7\")   Wt 76.4 kg (168 lb 8 oz)   SpO2 98%   BMI 26.39 kg/m      Do you need any medication refills at today's visit? n  "

## 2019-10-09 NOTE — PROGRESS NOTES
Visit Date:   10/09/2019      PRIMARY CARE PHYSICIAN:  Sejal Garcia MD.        Mercedes Segal is a 49-year-old female referred by Dr. Garcia for further evaluation of vertigo and lightheadedness.      HISTORY OF PRESENT ILLNESS:  On 08/15/2019 she got up out of bed.  The room was spinning.  She felt nauseated.  She felt unbalanced.  She did have an evaluation in the emergency room on that day and her examination was relatively unremarkable.  Laboratory work done in the emergency room included a normal basic metabolic panel, troponin, CBC.  She subsequently also had a lipid panel checked and her LDL cholesterol was 118.  TSH was normal.  Hepatic panel normal.  No imaging was done initially.      She subsequently had a brain MRI scan done on 09/06/2019 without contrast.  There were noted a few scattered areas of T2 signal hyperintensity in the subcortical white matter of the cerebral hemispheres.  These are small.  There was no abnormality on the diffusion scan.  There were no abnormalities in the cerebellum or brainstem and no posterior fossa lesion.  She also had a carotid ultrasound done that was negative in terms of carotid stenosis and there was anterograde flow in the vertebral arteries.      Following the acute event, the patient felt foggy in the head for about a week and was not thinking clearly.  Her sister who accompanies her today also indicated her speech sounded hollow for 3 days.  For a while,  she tended to move slowly.  She felt a quick movement might trigger the vertigo again.  She now feels lightheaded on occasion, but this is not related to any particular position of her head.  She has had no double vision or change in her hearing.      She does indicate the day before she developed the acute vertigo she had pain in her neck, but this resolved.  She has had no recent head or neck injury.      She has no history of migraine.      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      PAST MEDICAL HISTORY:  Notable for gestational diabetes and cholecystectomy.      CURRENT MEDICATIONS:   1.  Advil.   2.  Coenzyme   3.  Multivitamins.      ALLERGIES:  SHE IS ALLERGIC TO PENICILLIN.      FAMILY HISTORY:  Notable for a niece with hemiplegic migraine and a sister with epilepsy.      SOCIAL HISTORY:  The patient does smoke.  She occasionally uses alcohol.      PHYSICAL EXAMINATION:   GENERAL:  Reveals the patient is alert and cooperative.   VITAL SIGNS:  Heart rate 83.  Blood pressure 123/77. Cervical bruits not appreciated.  The Hallpike maneuver is negative.    HEENT:  Pupils are equal, round and react well to light.   NEUROLOGIC:  Funduscopic examination is unremarkable.  She has full extraocular motility without nystagmus.  Facial strength and sensation are normal.  Her speech is clear.  Palate moves in the midline.  Motor examination reveals normal strength.  Sensory examination is intact to position and vibration.  Finger-to-nose and heel-to-shin are done well.  She is able to tandem walk without difficulty. Reflexes are 2+.  Plantar responses are flexor.      Hearing is intact to bedside testing.      Hallpike maneuver is negative.      IMPRESSION:   1.  Acute vertigo on 08/15/2019.   2.  Report of changes in speech and foggy headedness following the event.   3.  Episodes of lightheadedness.   4.  Transient neck pain.   5.  MRI scan demonstrating nonspecific white matter lesions, likely incidental.      PLAN:  I did review the brain MRI scan with the patient and her sister.  The white matter lesions are nonspecific and not clearly relevant to her presenting symptoms.  I did discuss how these often are of uncertain etiology, but in her case I did encourage her to stop smoking as they can be seen in the small vessel degeneration.      I do want to take a closer look at her posterior circulation, particularly in view  of the neck pain that she experienced prior to the event.  I want to make certain we are not dealing with any abnormality in the vertebrobasilar system.  She is going to have MR angiograms of the head and neck for this purpose and I will contact her with the results.      Her residual lightheadedness could represent some residual vestibular dysfunction, although this is not evident on examination today.  She is considering whether or not to pursue formal vestibular testing which was going to be done at the [facility].  If she was to do this, she would prefer to have it done at the [place].      I will be contacting her with the results of the MR angiographic studies and at that time  she will tell me if she wishes to pursue the formal vestibular testing at the [place].     ADDENDUM 10/16/19: Head and Neck MRAs are normal. Called patient with results. She reports no further episodes of vertigo and no longer light headed. Will therefore hold on formal vestibular testing. She will contact me if future events        CARLITOS BRITTON MD             D: 10/09/2019   T: 10/09/2019   MT:       Name:     GISELLE ANDREW   MRN:      [id number]        Account:      [id number]   :      1970           Visit Date:   10/09/2019      Document: [id number]       cc: Sejal Garcia MD

## 2019-10-15 ENCOUNTER — HOSPITAL ENCOUNTER (OUTPATIENT)
Dept: MRI IMAGING | Facility: CLINIC | Age: 49
Discharge: HOME | End: 2019-10-15
Attending: PSYCHIATRY & NEUROLOGY | Admitting: PSYCHIATRY & NEUROLOGY
Payer: COMMERCIAL

## 2019-10-15 ENCOUNTER — HOSPITAL ENCOUNTER (OUTPATIENT)
Dept: MRI IMAGING | Facility: CLINIC | Age: 49
End: 2019-10-15
Attending: PSYCHIATRY & NEUROLOGY
Payer: COMMERCIAL

## 2019-10-15 DIAGNOSIS — R42 VERTIGO: ICD-10-CM

## 2019-10-15 DIAGNOSIS — M54.2 NECK PAIN: ICD-10-CM

## 2019-10-15 DIAGNOSIS — G45.0 VERTEBRAL BASILAR INSUFFICIENCY: ICD-10-CM

## 2019-10-15 PROCEDURE — 25500064 ZZH RX 255 OP 636: Performed by: PSYCHIATRY & NEUROLOGY

## 2019-10-15 PROCEDURE — 70544 MR ANGIOGRAPHY HEAD W/O DYE: CPT

## 2019-10-15 PROCEDURE — A9585 GADOBUTROL INJECTION: HCPCS | Performed by: PSYCHIATRY & NEUROLOGY

## 2019-10-15 PROCEDURE — 70549 MR ANGIOGRAPH NECK W/O&W/DYE: CPT

## 2019-10-15 PROCEDURE — 25000125 ZZHC RX 250: Performed by: PSYCHIATRY & NEUROLOGY

## 2019-10-15 RX ORDER — GADOBUTROL 604.72 MG/ML
7.5 INJECTION INTRAVENOUS ONCE
Status: COMPLETED | OUTPATIENT
Start: 2019-10-15 | End: 2019-10-15

## 2019-10-15 RX ORDER — GADOBUTROL 604.72 MG/ML
7.5 INJECTION INTRAVENOUS ONCE
Status: DISCONTINUED | OUTPATIENT
Start: 2019-10-15 | End: 2019-10-15

## 2019-10-15 RX ADMIN — GADOBUTROL 7.5 ML: 604.72 INJECTION INTRAVENOUS at 09:14

## 2019-10-15 RX ADMIN — SODIUM CHLORIDE 50 ML: 9 INJECTION, SOLUTION INTRAVENOUS at 09:15

## 2019-11-16 ENCOUNTER — TRANSFERRED RECORDS (OUTPATIENT)
Dept: HEALTH INFORMATION MANAGEMENT | Facility: CLINIC | Age: 49
End: 2019-11-16

## 2020-03-02 ENCOUNTER — HEALTH MAINTENANCE LETTER (OUTPATIENT)
Age: 50
End: 2020-03-02

## 2020-11-06 NOTE — PROGRESS NOTES
"Subjective     Mercedes Segal is a 50 year old female who presents to clinic today for the following health issues:    HPI         Musculoskeletal problem/pain  Onset/Duration: a couple of weeks.   Description  Location: ankle - left  Joint Swelling: YES  Redness: no  Pain: YES  Warmth: yes, when it is hurting. Warm sensation.   Intensity:  7/10  Progression of Symptoms:  same  Accompanying signs and symptoms:   Fevers: no  Numbness/tingling/weakness: no  History  Trauma to the area: no  Recent illness:  no  Previous similar problem: no  Previous evaluation:  no  Precipitating or alleviating factors:  Aggravating factors include: walking/overuse   Therapies tried and outcome: ice, acetaminophen and Ibuprofen-took lasting         Review of Systems   Constitutional, HEENT, cardiovascular, pulmonary, GI, , musculoskeletal, neuro, skin, endocrine and psych systems are negative, except as otherwise noted.      Objective    /66   Pulse 72   Temp 97.8  F (36.6  C) (Temporal)   Resp 16   Ht 1.725 m (5' 7.91\")   Wt 83.5 kg (184 lb)   LMP 02/19/2019   SpO2 96%   BMI 28.05 kg/m    Body mass index is 28.05 kg/m .  Physical Exam   GENERAL: healthy, alert and no distress  RESP: lungs clear to auscultation - no rales, rhonchi or wheezes  CV: regular rate and rhythm, normal S1 S2, no S3 or S4, no murmur, click or rub, no peripheral edema and peripheral pulses strong  MS: Right Ankle Exam   Right ankle exam is normal.      Left Ankle Exam     Tenderness   The patient is experiencing tenderness in the ATF.   Swelling: mild    Range of Motion   The patient has normal left ankle ROM.     Muscle Strength   The patient has normal left ankle strength.    Tests   Anterior drawer: negative  Varus tilt: negative    Other   Sensation: normal  Pulse: present            SKIN: no suspicious lesions or rashes  NEURO: Normal strength and tone, mentation intact and speech normal  PSYCH: mentation appears normal, affect " "normal/bright            Assessment & Plan       ICD-10-CM    1. Pain in joint, ankle and foot, left  M25.572    2. History of gestational diabetes  Z86.32 Lipid panel reflex to direct LDL Fasting     Basic metabolic panel   3. Encounter for screening mammogram for breast cancer  Z12.31 MA Screening Digital Bilateral      Discussed h/o frequent ankle rolling and possible contribution. I do not see significant instability in the ankle today. Although pain is at the ATFL area, she is having worsening of swelling throughout the day when she is busy at home and less so at work. Discussed that she is not wearing shoes or getting support at home. May want to start with an indoor shoe for support, RICE as needed and monitor for any injury, though she state she did not recall any. Likely OA and worse as she is gaining weight, so work to reduce weight could help, though she used to walk a lot. Encouraged less impact activities to help instead at this time. If not improving with these conservative measures, may consider podiatry referral.  Due for her follow-up labs and is curious with her h/o gestational diabetes how she is doing with her blood sugars and will get today as fasting.  Due for mammo this winter, wants 2 year screening plan.     Tobacco Cessation:   reports that she has been smoking cigarettes. She has a 20.00 pack-year smoking history. She has never used smokeless tobacco.  Tobacco Cessation Action Plan: Information offered: Patient not interested at this time      BMI:   Estimated body mass index is 28.05 kg/m  as calculated from the following:    Height as of this encounter: 1.725 m (5' 7.91\").    Weight as of this encounter: 83.5 kg (184 lb).   Weight management plan: Discussed healthy diet and exercise guidelines         Return in about 4 weeks (around 12/7/2020) for if not improved.    Sejal Garcia MD, MD  [facility]"

## 2020-11-09 ENCOUNTER — OFFICE VISIT (OUTPATIENT)
Dept: FAMILY MEDICINE | Facility: OTHER | Age: 50
End: 2020-11-09
Payer: COMMERCIAL

## 2020-11-09 VITALS
HEIGHT: 68 IN | SYSTOLIC BLOOD PRESSURE: 110 MMHG | RESPIRATION RATE: 16 BRPM | WEIGHT: 184 LBS | HEART RATE: 72 BPM | BODY MASS INDEX: 27.89 KG/M2 | OXYGEN SATURATION: 96 % | TEMPERATURE: 97.8 F | DIASTOLIC BLOOD PRESSURE: 66 MMHG

## 2020-11-09 DIAGNOSIS — M25.572 PAIN IN JOINT, ANKLE AND FOOT, LEFT: Primary | ICD-10-CM

## 2020-11-09 DIAGNOSIS — Z86.32 HISTORY OF GESTATIONAL DIABETES: ICD-10-CM

## 2020-11-09 DIAGNOSIS — Z12.31 ENCOUNTER FOR SCREENING MAMMOGRAM FOR BREAST CANCER: ICD-10-CM

## 2020-11-09 LAB
ANION GAP SERPL CALCULATED.3IONS-SCNC: 6 MMOL/L (ref 3–14)
BUN SERPL-MCNC: 21 MG/DL (ref 7–30)
CALCIUM SERPL-MCNC: 9.1 MG/DL (ref 8.5–10.1)
CHLORIDE SERPL-SCNC: 111 MMOL/L (ref 94–109)
CHOLEST SERPL-MCNC: 182 MG/DL
CO2 SERPL-SCNC: 24 MMOL/L (ref 20–32)
CREAT SERPL-MCNC: 0.81 MG/DL (ref 0.52–1.04)
GFR SERPL CREATININE-BSD FRML MDRD: 85 ML/MIN/{1.73_M2}
GLUCOSE SERPL-MCNC: 98 MG/DL (ref 70–99)
HDLC SERPL-MCNC: 50 MG/DL
LDLC SERPL CALC-MCNC: 118 MG/DL
NONHDLC SERPL-MCNC: 132 MG/DL
POTASSIUM SERPL-SCNC: 4.1 MMOL/L (ref 3.4–5.3)
SODIUM SERPL-SCNC: 141 MMOL/L (ref 133–144)
TRIGL SERPL-MCNC: 68 MG/DL

## 2020-11-09 PROCEDURE — 99214 OFFICE O/P EST MOD 30 MIN: CPT | Performed by: FAMILY MEDICINE

## 2020-11-09 PROCEDURE — 80061 LIPID PANEL: CPT | Performed by: FAMILY MEDICINE

## 2020-11-09 PROCEDURE — 80048 BASIC METABOLIC PNL TOTAL CA: CPT | Performed by: FAMILY MEDICINE

## 2020-11-09 ASSESSMENT — MIFFLIN-ST. JEOR: SCORE: 1506.74

## 2020-11-09 NOTE — RESULT ENCOUNTER NOTE
Mercedes, your lab results look stable, should follow annually.  Please let me know if you have any questions.  Sejal Garcia MD

## 2020-12-14 ENCOUNTER — HEALTH MAINTENANCE LETTER (OUTPATIENT)
Age: 50
End: 2020-12-14

## 2021-02-27 ENCOUNTER — HEALTH MAINTENANCE LETTER (OUTPATIENT)
Age: 51
End: 2021-02-27

## 2021-05-13 ENCOUNTER — OFFICE VISIT (OUTPATIENT)
Dept: OBGYN | Facility: OTHER | Age: 51
End: 2021-05-13
Payer: COMMERCIAL

## 2021-05-13 VITALS
SYSTOLIC BLOOD PRESSURE: 112 MMHG | WEIGHT: 190.25 LBS | DIASTOLIC BLOOD PRESSURE: 70 MMHG | OXYGEN SATURATION: 100 % | BODY MASS INDEX: 29 KG/M2 | HEART RATE: 74 BPM

## 2021-05-13 DIAGNOSIS — N95.0 POSTMENOPAUSAL BLEEDING: Primary | ICD-10-CM

## 2021-05-13 LAB — TSH SERPL DL<=0.005 MIU/L-ACNC: 1.92 MU/L (ref 0.4–4)

## 2021-05-13 PROCEDURE — 84443 ASSAY THYROID STIM HORMONE: CPT | Performed by: OBSTETRICS & GYNECOLOGY

## 2021-05-13 PROCEDURE — 99214 OFFICE O/P EST MOD 30 MIN: CPT | Performed by: OBSTETRICS & GYNECOLOGY

## 2021-05-13 PROCEDURE — 36415 COLL VENOUS BLD VENIPUNCTURE: CPT | Performed by: OBSTETRICS & GYNECOLOGY

## 2021-05-13 NOTE — PROGRESS NOTES
OB/GYN       NAME:  Mercedes Segal  PCP:  Sejal Garcia  MRN:  [id number]      Impression / Plan     51 year old  with:      ICD-10-CM    1. Postmenopausal bleeding  N95.0 TSH with free T4 reflex     US Pelvic Complete with Transvaginal       Discussed possible etiologies for postmenopausal bleeding.  Exam is normal today.  Review history, medication, etc.  Plan US to determine endometrial stripe and eval for polyps, fibroids. Plan TSH.  I will contact her with the results and follow up recommendations. If endometrial stripe is normal, then plan observation.  She will need endometrial biopsy if endometrial stripe thickened or if the spotting returns.  She is in agreement with plan.     Chief Complaint     Chief Complaint   Patient presents with     Consult     Spotting        HPI     Mercedes Segal is a  51 year old female who is seen for spotting.     Had one episode of spotting one evening on .  No cramping.      Increased mucous-like discharge for a few weeks. No itching or burning.  No foul smelling odor.   Nipples are tender for a few weeks.    No recent intercourse.  No straining for BMs    Her final menstrual period was 2019.  No bleeding aside from the one episode noted above.      No family history of uterine or bowel cancer.      Problem List     Patient Active Problem List    Diagnosis Date Noted     Tobacco abuse 2019     Priority: Medium     Menopausal syndrome (hot flashes) 2019     Priority: Medium     Warts 2015     Priority: Medium       Medications     Current Outpatient Medications   Medication     Coenzyme Q10 (CO Q 10 PO)     ibuprofen (ADVIL/MOTRIN) 800 MG tablet     Misc Natural Products (OSTEO BI-FLEX TRIPLE STRENGTH PO)     multivitamin w/minerals (MULTI-VITAMIN) tablet     nicotine (NICODERM CQ) 14 MG/24HR 24 hr patch     No current facility-administered medications for this visit.         Allergies     Allergies   Allergen Reactions      Penicillins Rash     Pt was a baby       Past Medical/Surgical History     Past Medical History:   Diagnosis Date     History of gestational diabetes 2012     Tobacco use disorder        Past Surgical History:   Procedure Laterality Date      SECTION       CHOLECYSTECTOMY  2017     DILATION AND CURETTAGE       HC TOOTH EXTRACTION W/FORCEP      Stella Teeth      TONSILLECTOMY       TUBAL LIGATION          Social History     Social History     Socioeconomic History     Marital status:      Spouse name: Not on file     Number of children: Not on file     Years of education: Not on file     Highest education level: Not on file   Occupational History     Not on file   Social Needs     Financial resource strain: Not on file     Food insecurity     Worry: Not on file     Inability: Not on file     Transportation needs     Medical: Not on file     Non-medical: Not on file   Tobacco Use     Smoking status: Current Every Day Smoker     Packs/day: 1.00     Years: 20.00     Pack years: 20.00     Types: Cigarettes     Smokeless tobacco: Never Used   Substance and Sexual Activity     Alcohol use: No     Drug use: No     Sexual activity: Yes     Partners: Male     Birth control/protection: Surgical   Lifestyle     Physical activity     Days per week: Not on file     Minutes per session: Not on file     Stress: Not on file   Relationships     Social connections     Talks on phone: Not on file     Gets together: Not on file     Attends Qu[employer] service: Not on file     Active member of club or organization: Not on file     Attends meetings of clubs or organizations: Not on file     Relationship status: Not on file     Intimate partner violence     Fear of current or ex partner: Not on file     Emotionally abused: Not on file     Physically abused: Not on file     Forced sexual activity: Not on file   Other Topics Concern     Parent/sibling w/ CABG, MI or angioplasty before 65F 55M? No      Service  No     Blood Transfusions No     Caffeine Concern No     Occupational Exposure No     Hobby Hazards No     Sleep Concern No     Stress Concern Yes     Comment: A little- Work & Home      Weight Concern Yes     Special Diet No     Back Care No     Exercise Yes     Comment: 2-3x weekly     Bike Helmet No     Seat Belt Yes     Self-Exams No   Social History Narrative     Not on file       Family History      Family History   Problem Relation Age of Onset     Other - See Comments Mother         Schizophrenia     Leukemia Mother         CLL     Osteoporosis Mother      Cancer Father         Lung Cancer     Cancer Brother         Leukemia     Asthma Other         nephew     Thyroid Disease Other         niece     Hypertension Maternal Aunt        ROS     A /GI organ review of systems was asked and the pertinent positives and negatives are listed in the HPI.     Physical Exam   Vitals: /70 (BP Location: Right arm, Cuff Size: Adult Regular)   Pulse 74   Wt 86.3 kg (190 lb 4 oz)   SpO2 100%   BMI 29.00 kg/m      General: Comfortable, no obvious distress  Psych: Alert. Appropriate affect,.  Normal speech.   : Normal female external genitalia.  No lesions.  Urethral meatus normal.  Speculum exam reveals a normal vaginal vault, normal cervix.  No lesions.  No abnormal discharge.  Bimanual exam reveals a normal, mobile, nontender uterus.  No cervical motion tenderness.  Adnexa nontender with no palpable masses.      Labs/Imaging       I have personally reviewed the labs/imaging and findings were:    TSH   Date Value Ref Range Status   08/29/2019 1.44 0.40 - 4.00 mU/L Final      No US.       30 min spent on the date of the encounter in chart review, patient visit, review of tests, documentation about the issues documented above.     Stephanie Holman MD

## 2021-05-21 ENCOUNTER — MYC MEDICAL ADVICE (OUTPATIENT)
Dept: OBGYN | Facility: OTHER | Age: 51
End: 2021-05-21

## 2021-05-21 NOTE — TELEPHONE ENCOUNTER
Per 5/13 OV plan:  Discussed possible etiologies for postmenopausal bleeding.  Exam is normal today.  Review history, medication, etc.  Plan US to determine endometrial stripe and eval for polyps, fibroids. Plan TSH.  I will contact her with the results and follow up recommendations. If endometrial stripe is normal, then plan observation.  She will need endometrial biopsy if endometrial stripe thickened or if the spotting returns.  She is in agreement with plan

## 2021-05-24 ENCOUNTER — ANCILLARY PROCEDURE (OUTPATIENT)
Dept: ULTRASOUND IMAGING | Facility: OTHER | Age: 51
End: 2021-05-24
Attending: OBSTETRICS & GYNECOLOGY
Payer: COMMERCIAL

## 2021-05-24 DIAGNOSIS — N95.0 POSTMENOPAUSAL BLEEDING: ICD-10-CM

## 2021-07-08 ENCOUNTER — OFFICE VISIT (OUTPATIENT)
Dept: OBGYN | Facility: OTHER | Age: 51
End: 2021-07-08
Payer: COMMERCIAL

## 2021-07-08 VITALS
BODY MASS INDEX: 28.89 KG/M2 | WEIGHT: 189.5 LBS | OXYGEN SATURATION: 100 % | SYSTOLIC BLOOD PRESSURE: 112 MMHG | DIASTOLIC BLOOD PRESSURE: 72 MMHG | HEART RATE: 78 BPM

## 2021-07-08 DIAGNOSIS — N95.0 POSTMENOPAUSAL BLEEDING: Primary | ICD-10-CM

## 2021-07-08 PROCEDURE — 88305 TISSUE EXAM BY PATHOLOGIST: CPT | Performed by: PATHOLOGY

## 2021-07-08 PROCEDURE — 58100 BIOPSY OF UTERUS LINING: CPT | Performed by: OBSTETRICS & GYNECOLOGY

## 2021-07-08 PROCEDURE — 99213 OFFICE O/P EST LOW 20 MIN: CPT | Mod: 25 | Performed by: OBSTETRICS & GYNECOLOGY

## 2021-07-08 RX ORDER — MELOXICAM 7.5 MG/1
7.5 TABLET ORAL
COMMUNITY
Start: 2021-06-15 | End: 2022-02-28

## 2021-07-08 NOTE — PROGRESS NOTES
OB/GYN      NAME:  Mercedes Segal  PCP:  Sejal Garcia  MRN:  [id number]    Impression / Plan     51 year old  with:      ICD-10-CM    1. Postmenopausal bleeding  N95.0 ENDOMETRIAL BIOPSY W/O CERVICAL DILATION     Surgical pathology exam       Recommend endometrial biopsy, which was done today, see below.  If normal, plan observation.  If bleeding returns, consider saline sonohystogram to evaluate for endometrial polyp.    Consider repeat US to monitor simple cyst on right ovary (4.1 cm) and complex cyst on left ovary (1.7 cm).       Chief Complaint     Chief Complaint   Patient presents with     Procedure     EMB       HPI     Mercedes Segal is a  51 year old female who is seen for endometrial biopsy.  I saw her 2021 for postmenopausal bleeding.  Normal exam at that time.  Ultrasound was done and endometrial stripe 8 mm.  There was also a fibroid, likely intramural, measuring 2.7 cm.  This was in the anterior body of the uterus.  Right ovary with simple cyst, 4.1 cm.  Otherwise normal ultrasound.  TSH   Date Value Ref Range Status   2021 1.92 0.40 - 4.00 mU/L Final      Endometrial biopsy was recommended due to thickened endometrial stripe.  Patient has had no bleeding since I saw her last.    In review, the patient had one episode of spotting one evening on .  She had increased mucus-like discharge for a few weeks.  Her final menstrual period was 2019.    Problem List     Patient Active Problem List    Diagnosis Date Noted     Tobacco abuse 2019     Priority: Medium     Menopausal syndrome (hot flashes) 2019     Priority: Medium     Warts 2015     Priority: Medium       Medications     Current Outpatient Medications   Medication     ibuprofen (ADVIL/MOTRIN) 800 MG tablet     meloxicam (MOBIC) 7.5 MG tablet     Misc Natural Products (OSTEO BI-FLEX TRIPLE STRENGTH PO)     multivitamin w/minerals (MULTI-VITAMIN) tablet     nicotine (NICODERM CQ) 14 MG/24HR  24 hr patch     No current facility-administered medications for this visit.         Allergies     Allergies   Allergen Reactions     Penicillins Rash     Pt was a baby       ROS     A  organ review of systems was asked and the pertinent positives and negatives are listed in the HPI.     Physical Exam   Vitals: /72 (BP Location: Right arm, Cuff Size: Adult Large)   Pulse 78   Wt 86 kg (189 lb 8 oz)   SpO2 100%   BMI 28.89 kg/m      General: Comfortable, no obvious distress  : Normal female external genitalia.  No lesions.  Urethral meatus normal.  Speculum exam reveals a normal vaginal vault, normal cervix.  No abnormal discharge.  Bimanual exam reveals a normal, mobile, nontender uterus.  No cervical motion tenderness.  Adnexa nontender with no palpable masses.          Labs/Imaging       I have personally reviewed the labs/imaging and the findings were:    Uterus 8 x 4.8 x 6.3 cm  fibroid, likely intramural, 2.7 cm, anterior body.   EMS 8 mm  Right ovary with simple cyst, 4.1 cm.   Left ovary with complex cyst, 1.7 cm.    No free fluid.       Undiagnosed problem with uncertain prognosis, personal review of US images, thyroid results.      Stephanie Holman MD         PROCEDURE: ENDOMETRIAL BIOPSY      After discussing the procedure and obtaining consent the patient was positioned in lithotomy and the cervix visualized with the speculum. The cervix was cleansed with betadine and anterior cervix grasped with allis.  Difficulty getting through the external os.  The allis was repositioned and the os finder used.  This felt to go through the internal os, but unable to advance the pipelle.  The allis was exchanged for the single tooth tenac and placed anterior.  The uterine explora was advanced to  7.5  cm and small amount of tissue obtained. She tolerated this ok, with some discomfort as expected.

## 2021-07-14 LAB — COPATH REPORT: NORMAL

## 2021-07-15 DIAGNOSIS — N83.201 CYSTS OF BOTH OVARIES: Primary | ICD-10-CM

## 2021-07-15 DIAGNOSIS — N83.202 CYSTS OF BOTH OVARIES: Primary | ICD-10-CM

## 2021-07-15 NOTE — RESULT ENCOUNTER NOTE
Hi,    Your pathology is normal.  Please let me know if you have any bleeding or spotting in the future.  Otherwise no additional treatment is needed at this time.     I recommend you repeat the ultrasound to monitor simple cyst on right ovary (4.1 cm) and complex cyst on left ovary (1.7 cm). This should be done at the end of August.    Please let me know if you have any questions or concerns.     Thanks!  Dr. Holman

## 2021-08-16 ENCOUNTER — ANCILLARY PROCEDURE (OUTPATIENT)
Dept: ULTRASOUND IMAGING | Facility: OTHER | Age: 51
End: 2021-08-16
Attending: OBSTETRICS & GYNECOLOGY
Payer: COMMERCIAL

## 2021-08-16 DIAGNOSIS — N83.201 CYSTS OF BOTH OVARIES: ICD-10-CM

## 2021-08-16 DIAGNOSIS — N83.202 CYSTS OF BOTH OVARIES: ICD-10-CM

## 2021-08-16 PROCEDURE — 76856 US EXAM PELVIC COMPLETE: CPT | Performed by: RADIOLOGY

## 2021-08-16 PROCEDURE — 76830 TRANSVAGINAL US NON-OB: CPT | Performed by: RADIOLOGY

## 2021-08-17 ENCOUNTER — MYC MEDICAL ADVICE (OUTPATIENT)
Dept: OBGYN | Facility: OTHER | Age: 51
End: 2021-08-17

## 2021-08-17 DIAGNOSIS — N83.202 CYSTS OF BOTH OVARIES: Primary | ICD-10-CM

## 2021-08-17 DIAGNOSIS — N83.201 CYSTS OF BOTH OVARIES: Primary | ICD-10-CM

## 2021-08-17 NOTE — TELEPHONE ENCOUNTER
Pt last seen 7/8/2021 for PMB,pt had US done yesterday.    Pt states she would like to f/u with an MRI.    RN routing to provider for advisement on orders or to f/u with provider prior to MRI.    Glory Velasquez RN on 8/17/2021 at 8:40 AM

## 2021-08-24 ENCOUNTER — HOSPITAL ENCOUNTER (OUTPATIENT)
Dept: MRI IMAGING | Facility: CLINIC | Age: 51
Discharge: HOME | End: 2021-08-24
Attending: OBSTETRICS & GYNECOLOGY | Admitting: OBSTETRICS & GYNECOLOGY
Payer: COMMERCIAL

## 2021-08-24 DIAGNOSIS — N83.202 CYSTS OF BOTH OVARIES: ICD-10-CM

## 2021-08-24 DIAGNOSIS — N83.201 CYSTS OF BOTH OVARIES: ICD-10-CM

## 2021-08-24 PROCEDURE — 250N000009 HC RX 250: Performed by: OBSTETRICS & GYNECOLOGY

## 2021-08-24 PROCEDURE — 72197 MRI PELVIS W/O & W/DYE: CPT

## 2021-08-24 PROCEDURE — A9585 GADOBUTROL INJECTION: HCPCS | Performed by: OBSTETRICS & GYNECOLOGY

## 2021-08-24 PROCEDURE — 255N000002 HC RX 255 OP 636: Performed by: OBSTETRICS & GYNECOLOGY

## 2021-08-24 RX ORDER — GADOBUTROL 604.72 MG/ML
10 INJECTION INTRAVENOUS ONCE
Status: COMPLETED | OUTPATIENT
Start: 2021-08-24 | End: 2021-08-24

## 2021-08-24 RX ADMIN — GADOBUTROL 8.5 ML: 604.72 INJECTION INTRAVENOUS at 08:34

## 2021-08-24 RX ADMIN — SODIUM CHLORIDE 50 ML: 9 INJECTION, SOLUTION INTRAVENOUS at 08:36

## 2021-08-26 ENCOUNTER — TELEPHONE (OUTPATIENT)
Dept: OBGYN | Facility: OTHER | Age: 51
End: 2021-08-26

## 2021-08-26 DIAGNOSIS — N83.202 CYSTS OF BOTH OVARIES: Primary | ICD-10-CM

## 2021-08-26 DIAGNOSIS — N83.201 CYSTS OF BOTH OVARIES: Primary | ICD-10-CM

## 2021-10-02 ENCOUNTER — HEALTH MAINTENANCE LETTER (OUTPATIENT)
Age: 51
End: 2021-10-02

## 2021-11-22 ENCOUNTER — TELEPHONE (OUTPATIENT)
Dept: FAMILY MEDICINE | Facility: OTHER | Age: 51
End: 2021-11-22
Payer: COMMERCIAL

## 2021-11-22 NOTE — TELEPHONE ENCOUNTER
Patient Quality Outreach Summary      Summary:    Patient is due for the following:   Physical with fasting labs  Mammogram  Colonoscopy   Type of outreach:    Phone, left message for patient/parent to call back.    Questions for provider review:    None                                                                                                                    Yoly Luna CMA       Chart routed to Care Team.

## 2022-01-22 ENCOUNTER — HEALTH MAINTENANCE LETTER (OUTPATIENT)
Age: 52
End: 2022-01-22

## 2022-02-22 ENCOUNTER — ANCILLARY PROCEDURE (OUTPATIENT)
Dept: MAMMOGRAPHY | Facility: OTHER | Age: 52
End: 2022-02-22
Payer: COMMERCIAL

## 2022-02-22 DIAGNOSIS — Z12.31 VISIT FOR SCREENING MAMMOGRAM: ICD-10-CM

## 2022-02-22 PROCEDURE — 77063 BREAST TOMOSYNTHESIS BI: CPT | Mod: TC | Performed by: RADIOLOGY

## 2022-02-22 PROCEDURE — 77067 SCR MAMMO BI INCL CAD: CPT | Mod: TC | Performed by: RADIOLOGY

## 2022-02-28 ENCOUNTER — OFFICE VISIT (OUTPATIENT)
Dept: FAMILY MEDICINE | Facility: OTHER | Age: 52
End: 2022-02-28
Payer: COMMERCIAL

## 2022-02-28 VITALS
DIASTOLIC BLOOD PRESSURE: 74 MMHG | WEIGHT: 187 LBS | OXYGEN SATURATION: 98 % | BODY MASS INDEX: 29.35 KG/M2 | HEIGHT: 67 IN | HEART RATE: 70 BPM | TEMPERATURE: 97.2 F | SYSTOLIC BLOOD PRESSURE: 116 MMHG | RESPIRATION RATE: 9 BRPM

## 2022-02-28 DIAGNOSIS — Z00.00 ROUTINE GENERAL MEDICAL EXAMINATION AT A HEALTH CARE FACILITY: Primary | ICD-10-CM

## 2022-02-28 DIAGNOSIS — Z12.11 SCREEN FOR COLON CANCER: ICD-10-CM

## 2022-02-28 DIAGNOSIS — F17.200 TOBACCO USE DISORDER: ICD-10-CM

## 2022-02-28 DIAGNOSIS — Z13.220 SCREENING FOR HYPERLIPIDEMIA: ICD-10-CM

## 2022-02-28 DIAGNOSIS — Z86.32 HISTORY OF GESTATIONAL DIABETES: ICD-10-CM

## 2022-02-28 LAB
CHOLEST SERPL-MCNC: 203 MG/DL
FASTING STATUS PATIENT QL REPORTED: YES
HBA1C MFR BLD: 5.4 % (ref 0–5.6)
HDLC SERPL-MCNC: 50 MG/DL
LDLC SERPL CALC-MCNC: 134 MG/DL
NONHDLC SERPL-MCNC: 153 MG/DL
TRIGL SERPL-MCNC: 97 MG/DL

## 2022-02-28 PROCEDURE — 90471 IMMUNIZATION ADMIN: CPT | Performed by: FAMILY MEDICINE

## 2022-02-28 PROCEDURE — 80061 LIPID PANEL: CPT | Performed by: FAMILY MEDICINE

## 2022-02-28 PROCEDURE — 36415 COLL VENOUS BLD VENIPUNCTURE: CPT | Performed by: FAMILY MEDICINE

## 2022-02-28 PROCEDURE — 83036 HEMOGLOBIN GLYCOSYLATED A1C: CPT | Performed by: FAMILY MEDICINE

## 2022-02-28 PROCEDURE — 99396 PREV VISIT EST AGE 40-64: CPT | Mod: 25 | Performed by: FAMILY MEDICINE

## 2022-02-28 PROCEDURE — 90715 TDAP VACCINE 7 YRS/> IM: CPT | Performed by: FAMILY MEDICINE

## 2022-02-28 RX ORDER — NICOTINE 21 MG/24HR
1 PATCH, TRANSDERMAL 24 HOURS TRANSDERMAL EVERY 24 HOURS
Qty: 30 PATCH | Refills: 1 | Status: SHIPPED | OUTPATIENT
Start: 2022-02-28

## 2022-02-28 ASSESSMENT — ENCOUNTER SYMPTOMS
JOINT SWELLING: 0
BREAST MASS: 0
PARESTHESIAS: 0
NAUSEA: 0
HEMATURIA: 0
CONSTIPATION: 0
HEARTBURN: 0
SORE THROAT: 0
NERVOUS/ANXIOUS: 0
DIARRHEA: 0
DYSURIA: 0
SHORTNESS OF BREATH: 0
FREQUENCY: 0
HEMATOCHEZIA: 0
EYE PAIN: 0
MYALGIAS: 0
ARTHRALGIAS: 0
COUGH: 0
WEAKNESS: 0
PALPITATIONS: 0
CHILLS: 0
DIZZINESS: 0
HEADACHES: 0
ABDOMINAL PAIN: 0
FEVER: 0

## 2022-02-28 ASSESSMENT — PAIN SCALES - GENERAL: PAINLEVEL: NO PAIN (0)

## 2022-02-28 NOTE — PROGRESS NOTES
Prior to immunization administration, verified patients identity using patient s name and date of birth. Please see Immunization Activity for additional information.     Screening Questionnaire for Adult Immunization    Are you sick today?   No   Do you have allergies to medications, food, a vaccine component or latex?   No   Have you ever had a serious reaction after receiving a vaccination?   No   Do you have a long-term health problem with heart, lung, kidney, or metabolic disease (e.g., diabetes), asthma, a blood disorder, no spleen, complement component deficiency, a cochlear implant, or a spinal fluid leak?  Are you on long-term aspirin therapy?   No   Do you have cancer, leukemia, HIV/AIDS, or any other immune system problem?   No   Do you have a parent, brother, or sister with an immune system problem?   No   In the past 3 months, have you taken medications that affect  your immune system, such as prednisone, other steroids, or anticancer drugs; drugs for the treatment of rheumatoid arthritis, Crohn s disease, or psoriasis; or have you had radiation treatments?   No   Have you had a seizure, or a brain or other nervous system problem?   No   During the past year, have you received a transfusion of blood or blood    products, or been given immune (gamma) globulin or antiviral drug?   No   For women: Are you pregnant or is there a chance you could become       pregnant during the next month?   No   Have you received any vaccinations in the past 4 weeks?   No     Immunization questionnaire answers were all negative.        Per orders of Dr. Garcia, injection of TDAP given by Sharla Lehman. Patient instructed to remain in clinic for 15 minutes afterwards, and to report any adverse reaction to me immediately.       Screening performed by Sharla Lehman on 2/28/2022 at 7:34 AM.

## 2022-02-28 NOTE — PROGRESS NOTES
SUBJECTIVE:   CC: Mercedes Segal is an 51 year old woman who presents for preventive health visit.       Patient has been advised of split billing requirements and indicates understanding: Yes  Healthy Habits:     Getting at least 3 servings of Calcium per day:  Yes    Bi-annual eye exam:  Yes    Dental care twice a year:  Yes    Sleep apnea or symptoms of sleep apnea:  Daytime drowsiness    Diet:  Regular (no restrictions)    Frequency of exercise:  None    Taking medications regularly:  Yes    Medication side effects:  None    PHQ-2 Total Score: 1    Additional concerns today:  Yes      Had diarrhea a lot after she ate and took a b complex which helped.        Today's PHQ-2 Score:   PHQ-2 ( 1999 Pfizer) 2/28/2022   Q1: Little interest or pleasure in doing things 1   Q2: Feeling down, depressed or hopeless 0   PHQ-2 Score 1   PHQ-2 Total Score (12-17 Years)- Positive if 3 or more points; Administer PHQ-A if positive -   Q1: Little interest or pleasure in doing things Several days   Q2: Feeling down, depressed or hopeless Not at all   PHQ-2 Score 1       Abuse: Current or Past (Physical, Sexual or Emotional) - No  Do you feel safe in your environment? Yes    Have you ever done Advance Care Planning? (For example, a Health Directive, POLST, or a discussion with a medical provider or your loved ones about your wishes): No, advance care planning information given to patient to review.  Advanced care planning was discussed at today's visit.    Social History     Tobacco Use     Smoking status: Current Every Day Smoker     Packs/day: 1.00     Years: 20.00     Pack years: 20.00     Types: Cigarettes     Smokeless tobacco: Never Used   Substance Use Topics     Alcohol use: Yes     Comment: rare     If you drink alcohol do you typically have >3 drinks per day or >7 drinks per week? No    Alcohol Use 2/28/2022   Prescreen: >3 drinks/day or >7 drinks/week? No   Prescreen: >3 drinks/day or >7 drinks/week? -   AUDIT SCORE  -        Reviewed orders with patient.  Reviewed health maintenance and updated orders accordingly - Yes  Lab work is in process    Breast Cancer Screening:    Breast CA Risk Assessment (FHS-7) 2/28/2022   Do you have a family history of breast, colon, or ovarian cancer? No / Unknown         Mammogram Screening: Recommended annual mammography  Pertinent mammograms are reviewed under the imaging tab.    History of abnormal Pap smear: NO - age 30-65 PAP every 5 years with negative HPV co-testing recommended  PAP / HPV Latest Ref Rng & Units 5/9/2019 5/23/2016 8/22/2012   PAP (Historical) - NIL NIL NIL   HPV16 NEG:Negative Negative Negative -   HPV18 NEG:Negative Negative Negative -   HRHPV NEG:Negative Negative Negative -     Reviewed and updated as needed this visit by clinical staff   Tobacco  Allergies  Meds  Problems  Med Hx  Surg Hx  Fam Hx  Soc   Hx        Reviewed and updated as needed this visit by Provider   Tobacco  Allergies  Meds  Problems  Med Hx  Surg Hx  Fam Hx             Review of Systems   Constitutional: Negative for chills and fever.   HENT: Negative for congestion, ear pain, hearing loss and sore throat.    Eyes: Negative for pain and visual disturbance.   Respiratory: Negative for cough and shortness of breath.    Cardiovascular: Negative for chest pain, palpitations and peripheral edema.   Gastrointestinal: Negative for abdominal pain, constipation, diarrhea, heartburn, hematochezia and nausea.   Breasts:  Negative for tenderness, breast mass and discharge.   Genitourinary: Negative for dysuria, frequency, genital sores, hematuria, pelvic pain, urgency, vaginal bleeding and vaginal discharge.   Musculoskeletal: Negative for arthralgias, joint swelling and myalgias.   Skin: Negative for rash.   Neurological: Negative for dizziness, weakness, headaches and paresthesias.   Psychiatric/Behavioral: Positive for mood changes. The patient is not nervous/anxious.           OBJECTIVE:   BP  "116/74   Pulse 70   Temp 97.2  F (36.2  C) (Temporal)   Resp 9   Ht 1.7 m (5' 6.93\")   Wt 84.8 kg (187 lb)   SpO2 98%   BMI 29.35 kg/m    Physical Exam  GENERAL: healthy, alert and no distress  EYES: Eyes grossly normal to inspection, PERRL and conjunctivae and sclerae normal  HENT: ear canals and . normal, nose and mouth without ulcers or lesions  NECK: no adenopathy, no asymmetry, masses, or scars and thyroid normal to palpation  RESP: lungs clear to auscultation - no rales, rhonchi or wheezes  BREAST: normal without masses, tenderness or nipple discharge and no palpable axillary masses or adenopathy  CV: regular rate and rhythm, normal S1 S2, no S3 or S4, no murmur, click or rub, no peripheral edema and peripheral pulses strong  ABDOMEN: soft, nontender, no hepatosplenomegaly, no masses and bowel sounds normal  MS: no gross musculoskeletal defects noted, no edema  SKIN: no suspicious lesions or rashes  NEURO: Normal strength and tone, mentation intact and speech normal  PSYCH: mentation appears normal, affect normal/bright        ASSESSMENT/PLAN:       ICD-10-CM    1. Routine general medical examination at a health care facility  Z00.00 TDAP VACCINE (Adacel, Boostrix)  [[id number]]   2. Screen for colon cancer  Z12.11 Adult Gastro Ref - Procedure Only   3. Screening for hyperlipidemia  Z13.220 Lipid panel reflex to direct LDL Fasting     Lipid panel reflex to direct LDL Fasting   4. History of gestational diabetes  Z86.32 Hemoglobin A1c     Hemoglobin A1c   5. Tobacco use disorder  F17.200 nicotine (NICODERM CQ) 21 MG/24HR 24 hr patch     Patient has history of gestational diabetes though had drinking coconut water this morning and could not do a fasting blood sugar.  We will instead do a A1c and cholesterol.  We advised Covid and flu vaccinations today and she declined go we also discussed pneumonia vaccination which she will look into cost as well as the shingles vaccine which she would like but would " "receive more efficiently for cost at the pharmacy.  Patient has had some mild weight loss for which we are congratulated her though her BMI is still above desired and she is working to continue to make progress.  She also is interested in quitting smoking and will start with a 21 mcg patch on her way to wean off of cigarrettes    Patient has been advised of split billing requirements and indicates understanding: Yes    COUNSELING:  Reviewed preventive health counseling, as reflected in patient instructions       Regular exercise       Healthy diet/nutrition    Estimated body mass index is 29.35 kg/m  as calculated from the following:    Height as of this encounter: 1.7 m (5' 6.93\").    Weight as of this encounter: 84.8 kg (187 lb).    Weight management plan: Discussed healthy diet and exercise guidelines    She reports that she has been smoking cigarettes. She has a 20.00 pack-year smoking history. She has never used smokeless tobacco.  Tobacco Cessation Action Plan:   Information offered: Patient not interested at this time      Counseling Resources:  ATP IV Guidelines  Pooled Cohorts Equation Calculator  Breast Cancer Risk Calculator  BRCA-Related Cancer Risk Assessment: FHS-7 Tool  FRAX Risk Assessment  ICSI Preventive Guidelines  Dietary Guidelines for Americans, 2010  USDA's MyPlate  ASA Prophylaxis  Lung CA Screening    Sejal Garcia MD, MD  [employer]  "

## 2022-03-04 ENCOUNTER — HOSPITAL ENCOUNTER (OUTPATIENT)
Dept: MAMMOGRAPHY | Facility: CLINIC | Age: 52
End: 2022-03-04
Attending: FAMILY MEDICINE
Payer: COMMERCIAL

## 2022-03-04 ENCOUNTER — HOSPITAL ENCOUNTER (OUTPATIENT)
Dept: ULTRASOUND IMAGING | Facility: CLINIC | Age: 52
End: 2022-03-04
Attending: FAMILY MEDICINE
Payer: COMMERCIAL

## 2022-03-04 DIAGNOSIS — R92.8 ABNORMAL MAMMOGRAM: ICD-10-CM

## 2022-03-04 PROCEDURE — 76642 ULTRASOUND BREAST LIMITED: CPT | Mod: RT

## 2022-03-04 PROCEDURE — 77061 BREAST TOMOSYNTHESIS UNI: CPT | Mod: RT

## 2022-08-16 ENCOUNTER — OFFICE VISIT (OUTPATIENT)
Dept: FAMILY MEDICINE | Facility: OTHER | Age: 52
End: 2022-08-16
Payer: COMMERCIAL

## 2022-08-16 VITALS
HEART RATE: 70 BPM | HEIGHT: 67 IN | BODY MASS INDEX: 29.82 KG/M2 | SYSTOLIC BLOOD PRESSURE: 116 MMHG | WEIGHT: 190 LBS | DIASTOLIC BLOOD PRESSURE: 77 MMHG | TEMPERATURE: 98.6 F | OXYGEN SATURATION: 96 %

## 2022-08-16 DIAGNOSIS — F17.210 SMOKES CIGARETTES: ICD-10-CM

## 2022-08-16 DIAGNOSIS — Z12.11 SCREEN FOR COLON CANCER: Primary | ICD-10-CM

## 2022-08-16 DIAGNOSIS — B07.0 PLANTAR WARTS: ICD-10-CM

## 2022-08-16 PROCEDURE — 17110 DESTRUCTION B9 LES UP TO 14: CPT | Performed by: STUDENT IN AN ORGANIZED HEALTH CARE EDUCATION/TRAINING PROGRAM

## 2022-08-16 PROCEDURE — 99213 OFFICE O/P EST LOW 20 MIN: CPT | Mod: 25 | Performed by: STUDENT IN AN ORGANIZED HEALTH CARE EDUCATION/TRAINING PROGRAM

## 2022-08-16 ASSESSMENT — PAIN SCALES - GENERAL: PAINLEVEL: MILD PAIN (2)

## 2022-08-16 NOTE — PROGRESS NOTES
Assessment & Plan     Screen for colon cancer  Due for colon cancer screening, did talk about options, she is very precontemplative.  We will discuss this at next visit.    Smokes cigarettes  Currently on nicotine patches and lozenges.  Offered Wellbutrin and Chantix.  She will think about it.  We will continue to rediscuss and evaluate.    Plantar warts  Procedure: Plantar side of the left mid foot x1  Verbal consent was obtained from the patient, risks and benefits were discussed and they understand.  Time out was done, patient stated name and date of birth and planned procedure  Location: Left ball/mid foot  Anesthesia: None  Method: Paring down with #15 blade, cryotherapy x3x, finished with trichloracetic acid with bandage over the surface.  Patient tolerated the procedure well, all questions were addressed.    Patient should continue utilizing salicylic acid over-the-counter at home daily until her follow-up in 4 to 6 weeks.        Return in about 4 weeks (around 9/13/2022) for Recheck - wart.    ESME MARX MD  [facility] ALANA Long is a 52 year old, presenting for the following health issues:  Wart      History of Present Illness       Reason for visit:  Planter wart  Symptom onset:  More than a month  Symptoms include:  Wart on bottom of foot  Had these symptoms before:  Yes  Has tried/received treatment for these symptoms:  Yes  Previous treatment was successful:  Yes  Prior treatment description:  Frozen off and burnt off    She eats 0-1 servings of fruits and vegetables daily.She consumes 0 sweetened beverage(s) daily.She exercises with enough effort to increase her heart rate 9 or less minutes per day.  She exercises with enough effort to increase her heart rate 3 or less days per week.   She is taking medications regularly.         Review of Systems   Constitutional, HEENT, cardiovascular, pulmonary, gi and gu systems are negative, except as otherwise  "noted.      Objective    /77 (Cuff Size: Adult Regular)   Pulse 70   Temp 98.6  F (37  C) (Oral)   Ht 1.7 m (5' 6.93\")   Wt 86.2 kg (190 lb)   SpO2 96%   BMI 29.82 kg/m    Body mass index is 29.82 kg/m .  Physical Exam  Vitals and nursing note reviewed.   Constitutional:       General: She is not in acute distress.     Appearance: Normal appearance. She is not ill-appearing, toxic-appearing or diaphoretic.   HENT:      Head: Normocephalic and atraumatic.      Right Ear: Tympanic membrane, ear canal and external ear normal. There is no impacted cerumen.      Left Ear: Tympanic membrane, ear canal and external ear normal. There is no impacted cerumen.      Nose: Nose normal. No congestion or rhinorrhea.      Mouth/Throat:      Mouth: Mucous membranes are moist.      Pharynx: Oropharynx is clear. No oropharyngeal exudate or posterior oropharyngeal erythema.   Eyes:      General:         Right eye: No discharge.         Left eye: No discharge.      Extraocular Movements: Extraocular movements intact.      Conjunctiva/sclera: Conjunctivae normal.      Pupils: Pupils are equal, round, and reactive to light.   Cardiovascular:      Rate and Rhythm: Normal rate and regular rhythm.      Heart sounds: No murmur heard.  Pulmonary:      Effort: Pulmonary effort is normal. No respiratory distress.      Breath sounds: Normal breath sounds.   Musculoskeletal:         General: Normal range of motion.      Cervical back: Normal range of motion.   Lymphadenopathy:      Cervical: No cervical adenopathy.   Skin:     Comments: x1 wart over L foot plantar side   Neurological:      Mental Status: She is alert.   Psychiatric:         Mood and Affect: Mood normal.         Behavior: Behavior normal.         Thought Content: Thought content normal.                    .  ..  "

## 2022-08-16 NOTE — PATIENT INSTRUCTIONS
Continue the over the counter wart medicine daily as tolerated (liquid). Consider shaving/paring down the wart if it is thick/calloused.

## 2022-09-03 ENCOUNTER — HEALTH MAINTENANCE LETTER (OUTPATIENT)
Age: 52
End: 2022-09-03

## 2023-04-29 ENCOUNTER — HEALTH MAINTENANCE LETTER (OUTPATIENT)
Age: 53
End: 2023-04-29

## 2023-05-01 ENCOUNTER — OFFICE VISIT (OUTPATIENT)
Dept: FAMILY MEDICINE | Facility: OTHER | Age: 53
End: 2023-05-01
Payer: COMMERCIAL

## 2023-05-01 VITALS
OXYGEN SATURATION: 94 % | DIASTOLIC BLOOD PRESSURE: 70 MMHG | SYSTOLIC BLOOD PRESSURE: 124 MMHG | WEIGHT: 188 LBS | HEART RATE: 75 BPM | BODY MASS INDEX: 29.51 KG/M2 | TEMPERATURE: 98.4 F | RESPIRATION RATE: 20 BRPM | HEIGHT: 67 IN

## 2023-05-01 DIAGNOSIS — F17.200 TOBACCO USE DISORDER: ICD-10-CM

## 2023-05-01 DIAGNOSIS — Z12.11 SCREEN FOR COLON CANCER: ICD-10-CM

## 2023-05-01 DIAGNOSIS — Z13.220 SCREENING FOR HYPERLIPIDEMIA: ICD-10-CM

## 2023-05-01 DIAGNOSIS — Z12.31 VISIT FOR SCREENING MAMMOGRAM: ICD-10-CM

## 2023-05-01 DIAGNOSIS — Z72.0 TOBACCO ABUSE: ICD-10-CM

## 2023-05-01 DIAGNOSIS — N95.1 MENOPAUSAL SYNDROME (HOT FLASHES): ICD-10-CM

## 2023-05-01 DIAGNOSIS — Z00.00 ROUTINE GENERAL MEDICAL EXAMINATION AT A HEALTH CARE FACILITY: Primary | ICD-10-CM

## 2023-05-01 DIAGNOSIS — E66.3 OVERWEIGHT: ICD-10-CM

## 2023-05-01 LAB
ANION GAP SERPL CALCULATED.3IONS-SCNC: 12 MMOL/L (ref 7–15)
BUN SERPL-MCNC: 17.3 MG/DL (ref 6–20)
CALCIUM SERPL-MCNC: 9.4 MG/DL (ref 8.6–10)
CHLORIDE SERPL-SCNC: 103 MMOL/L (ref 98–107)
CHOLEST SERPL-MCNC: 215 MG/DL
CREAT SERPL-MCNC: 0.84 MG/DL (ref 0.51–0.95)
DEPRECATED HCO3 PLAS-SCNC: 24 MMOL/L (ref 22–29)
GFR SERPL CREATININE-BSD FRML MDRD: 83 ML/MIN/1.73M2
GLUCOSE SERPL-MCNC: 92 MG/DL (ref 70–99)
HBA1C MFR BLD: 5.4 % (ref 0–5.6)
HDLC SERPL-MCNC: 49 MG/DL
LDLC SERPL CALC-MCNC: 142 MG/DL
NONHDLC SERPL-MCNC: 166 MG/DL
POTASSIUM SERPL-SCNC: 4 MMOL/L (ref 3.4–5.3)
SODIUM SERPL-SCNC: 139 MMOL/L (ref 136–145)
TRIGL SERPL-MCNC: 119 MG/DL

## 2023-05-01 PROCEDURE — 80048 BASIC METABOLIC PNL TOTAL CA: CPT | Performed by: FAMILY MEDICINE

## 2023-05-01 PROCEDURE — 99396 PREV VISIT EST AGE 40-64: CPT | Mod: 25 | Performed by: FAMILY MEDICINE

## 2023-05-01 PROCEDURE — 90677 PCV20 VACCINE IM: CPT | Performed by: FAMILY MEDICINE

## 2023-05-01 PROCEDURE — 90472 IMMUNIZATION ADMIN EACH ADD: CPT | Performed by: FAMILY MEDICINE

## 2023-05-01 PROCEDURE — 99213 OFFICE O/P EST LOW 20 MIN: CPT | Mod: 25 | Performed by: FAMILY MEDICINE

## 2023-05-01 PROCEDURE — 80061 LIPID PANEL: CPT | Performed by: FAMILY MEDICINE

## 2023-05-01 PROCEDURE — 36415 COLL VENOUS BLD VENIPUNCTURE: CPT | Performed by: FAMILY MEDICINE

## 2023-05-01 PROCEDURE — 90471 IMMUNIZATION ADMIN: CPT | Performed by: FAMILY MEDICINE

## 2023-05-01 PROCEDURE — 83036 HEMOGLOBIN GLYCOSYLATED A1C: CPT | Performed by: FAMILY MEDICINE

## 2023-05-01 PROCEDURE — 90750 HZV VACC RECOMBINANT IM: CPT | Performed by: FAMILY MEDICINE

## 2023-05-01 RX ORDER — BUPROPION HYDROCHLORIDE 150 MG/1
150 TABLET ORAL EVERY MORNING
Qty: 90 TABLET | Refills: 0 | Status: SHIPPED | OUTPATIENT
Start: 2023-05-01

## 2023-05-01 ASSESSMENT — ENCOUNTER SYMPTOMS
SORE THROAT: 0
CONSTIPATION: 0
CHILLS: 0
NERVOUS/ANXIOUS: 0
MYALGIAS: 0
DIZZINESS: 0
ARTHRALGIAS: 0
HEARTBURN: 0
NAUSEA: 0
HEMATOCHEZIA: 0
WEAKNESS: 0
HEMATURIA: 0
EYE PAIN: 0
PALPITATIONS: 0
DIARRHEA: 1
FREQUENCY: 0
FEVER: 0
BREAST MASS: 0
PARESTHESIAS: 0
SHORTNESS OF BREATH: 0
HEADACHES: 0
DYSURIA: 0
ABDOMINAL PAIN: 0
JOINT SWELLING: 0
COUGH: 0

## 2023-05-01 ASSESSMENT — PAIN SCALES - GENERAL: PAINLEVEL: NO PAIN (0)

## 2023-05-01 ASSESSMENT — PATIENT HEALTH QUESTIONNAIRE - PHQ9
10. IF YOU CHECKED OFF ANY PROBLEMS, HOW DIFFICULT HAVE THESE PROBLEMS MADE IT FOR YOU TO DO YOUR WORK, TAKE CARE OF THINGS AT HOME, OR GET ALONG WITH OTHER PEOPLE: SOMEWHAT DIFFICULT
SUM OF ALL RESPONSES TO PHQ QUESTIONS 1-9: 17
SUM OF ALL RESPONSES TO PHQ QUESTIONS 1-9: 17

## 2023-05-01 NOTE — PROGRESS NOTES
Prior to immunization administration, verified patients identity using patient s name and date of birth. Please see Immunization Activity for additional information.     Screening Questionnaire for Adult Immunization    Are you sick today?   No   Do you have allergies to medications, food, a vaccine component or latex?   Yes: PNC   Have you ever had a serious reaction after receiving a vaccination?   No   Do you have a long-term health problem with heart, lung, kidney, or metabolic disease (e.g., diabetes), asthma, a blood disorder, no spleen, complement component deficiency, a cochlear implant, or a spinal fluid leak?  Are you on long-term aspirin therapy?   No   Do you have cancer, leukemia, HIV/AIDS, or any other immune system problem?   No   Do you have a parent, brother, or sister with an immune system problem?   No   In the past 3 months, have you taken medications that affect  your immune system, such as prednisone, other steroids, or anticancer drugs; drugs for the treatment of rheumatoid arthritis, Crohn s disease, or psoriasis; or have you had radiation treatments?   No   Have you had a seizure, or a brain or other nervous system problem?   No   During the past year, have you received a transfusion of blood or blood    products, or been given immune (gamma) globulin or antiviral drug?   No   For women: Are you pregnant or is there a chance you could become       pregnant during the next month?   No   Have you received any vaccinations in the past 4 weeks?   No     Immunization questionnaire was positive for at least one answer.  Notified Dr. Garcia.      Injection of Prevnar 20/Shingrix given by Domonique Tang CMA. Patient instructed to remain in clinic for 15 minutes afterwards, and to report any adverse reactions.     Screening performed by Domonique Tang CMA on 5/1/2023 at 6:20 PM.

## 2023-05-01 NOTE — PATIENT INSTRUCTIONS
4 mg estroven - herbal over the counter      Preventive Health Recommendations  Female Ages 50 - 64    Yearly exam: See your health care provider every year in order to  Review health changes.   Discuss preventive care.    Review your medicines if your doctor has prescribed any.    Get a Pap test every three years (unless you have an abnormal result and your provider advises testing more often).  If you get Pap tests with HPV test, you only need to test every 5 years, unless you have an abnormal result.   You do not need a Pap test if your uterus was removed (hysterectomy) and you have not had cancer.  You should be tested each year for STDs (sexually transmitted diseases) if you're at risk.   Have a mammogram every 1 to 2 years.  Have a colonoscopy at age 50, or have a yearly FIT test (stool test). These exams screen for colon cancer.    Have a cholesterol test every 5 years, or more often if advised.  Have a diabetes test (fasting glucose) every three years. If you are at risk for diabetes, you should have this test more often.   If you are at risk for osteoporosis (brittle bone disease), think about having a bone density scan (DEXA).    Shots: Get a flu shot each year. Get a tetanus shot every 10 years.    Nutrition:   Eat at least 5 servings of fruits and vegetables each day.  Eat whole-grain bread, whole-wheat pasta and brown rice instead of white grains and rice.  Get adequate Calcium and Vitamin D.     Lifestyle  Exercise at least 150 minutes a week (30 minutes a day, 5 days a week). This will help you control your weight and prevent disease.  Limit alcohol to one drink per day.  No smoking.   Wear sunscreen to prevent skin cancer.   See your dentist every six months for an exam and cleaning.  See your eye doctor every 1 to 2 years.

## 2023-05-01 NOTE — PROGRESS NOTES
SUBJECTIVE:   CC: Mercedes is an 53 year old who presents for preventive health visit.       2023     5:13 PM   Additional Questions   Roomed by Olena OLVERA   Accompanied by self         2023     5:14 PM   Patient Reported Additional Medications   Patient reports taking the following new medications Vitamin D, Magnesium, B-12 complex   Patient has been advised of split billing requirements and indicates understanding: Yes  Healthy Habits:     Getting at least 3 servings of Calcium per day:  NO    Bi-annual eye exam:  Yes    Dental care twice a year:  Yes    Sleep apnea or symptoms of sleep apnea:  Daytime drowsiness    Diet:  Regular (no restrictions)    Frequency of exercise:  None    Taking medications regularly:  Yes    Medication side effects:  None    PHQ-2 Total Score: 5    Additional concerns today:  No              Today's PHQ-2 Score:       2023     5:13 PM   PHQ-2 (  Pfizer)   Q1: Little interest or pleasure in doing things 3   Q2: Feeling down, depressed or hopeless 2   PHQ-2 Score 5   Q1: Little interest or pleasure in doing things More than half the days    Nearly every day   Q2: Feeling down, depressed or hopeless More than half the days    More than half the days   PHQ-2 Score 4    5           Social History     Tobacco Use     Smoking status: Every Day     Packs/day: 1.00     Years: 20.00     Pack years: 20.00     Types: Cigarettes     Smokeless tobacco: Never   Vaping Use     Vaping status: Never Used   Substance Use Topics     Alcohol use: Yes     Comment: rare             2023     5:13 PM   Alcohol Use   Prescreen: >3 drinks/day or >7 drinks/week? No     Reviewed orders with patient.  Reviewed health maintenance and updated orders accordingly - Yes  Lab work is in process    Breast Cancer Screenin/28/2022     6:51 AM   Breast CA Risk Assessment (FHS-7)   Do you have a family history of breast, colon, or ovarian cancer? No / Unknown       Mammogram Screening: Recommended  annual mammography  Pertinent mammograms are reviewed under the imaging tab.    History of abnormal Pap smear: NO - age 30-65 PAP every 5 years with negative HPV co-testing recommended      Latest Ref Rng & Units 5/9/2019     8:37 AM 5/9/2019     8:30 AM 5/23/2016     7:50 AM   PAP / HPV   PAP (Historical)  NIL       HPV 16 DNA NEG^Negative  Negative   Negative     HPV 18 DNA NEG^Negative  Negative   Negative     Other HR HPV NEG^Negative  Negative   Negative       Reviewed and updated as needed this visit by clinical staff   Tobacco  Allergies  Meds              Reviewed and updated as needed this visit by Provider                   She reports that she has started menopause and her last period was in 2018. Her primary symptoms are hot flashes, alopecia, mood swings including easily becoming irritable, and increased weight. She s also reporting increased anxiety/stress level due to work. She has trouble falling asleep and has tried melatonin but still reports intermittent sleep disruption. She does not feel rested when she wakes up. She smokes and drinks during the week and would like to discuss medications to assist with cessation. She has no family history of breast cancer and is frustrated with having to return to the hospital for additional testing after previous mammograms revealed cysts.    Review of Systems   Constitutional: Negative for chills and fever.   HENT: Negative for congestion, ear pain, hearing loss and sore throat.    Eyes: Negative for pain and visual disturbance.   Respiratory: Negative for cough and shortness of breath.    Cardiovascular: Negative for chest pain, palpitations and peripheral edema.   Gastrointestinal: Positive for diarrhea. Negative for abdominal pain, constipation, heartburn, hematochezia and nausea.   Breasts:  Negative for tenderness, breast mass and discharge.   Genitourinary: Negative for dysuria, frequency, genital sores, hematuria, pelvic pain, urgency, vaginal  "bleeding and vaginal discharge.   Musculoskeletal: Negative for arthralgias, joint swelling and myalgias.   Skin: Negative for rash.   Neurological: Negative for dizziness, weakness, headaches and paresthesias.   Psychiatric/Behavioral: Positive for mood changes. The patient is not nervous/anxious.         OBJECTIVE:   /70   Pulse 75   Temp 98.4  F (36.9  C) (Temporal)   Resp 20   Ht 1.695 m (5' 6.73\")   Wt 85.3 kg (188 lb)   LMP 02/19/2019   SpO2 94%   BMI 29.68 kg/m    Physical Exam  GENERAL: healthy, alert and no distress  HENT: ear canals and TM's normal, nose and mouth without ulcers or lesions  NECK: no adenopathy, no asymmetry, masses, or scars and thyroid normal to palpation  RESP: lungs clear to auscultation - no rales, rhonchi or wheezes  BREAST: normal without masses, tenderness or nipple discharge and no palpable axillary masses or adenopathy  CV: regular rate and rhythm, normal S1 S2, no S3 or S4, no murmur, click or rub, no peripheral edema and peripheral pulses strong  ABDOMEN: soft, nontender, no hepatosplenomegaly, no masses and bowel sounds normal  NEURO: mentation intact and speech normal  PSYCH: mentation appears normal, affect normal/bright    Diagnostic Test Results:  Labs reviewed in Epic    ASSESSMENT/PLAN:       ICD-10-CM    1. Routine general medical examination at a health care facility  Z00.00 MA Screen Bilateral w/Bob      2. Overweight  E66.3 Hemoglobin A1c     Hemoglobin A1c      3. Tobacco use disorder  F17.200       4. Menopausal syndrome (hot flashes)  N95.1 BASIC METABOLIC PANEL     BASIC METABOLIC PANEL      5. Screen for colon cancer  Z12.11 Colonoscopy Screening  Referral      6. Visit for screening mammogram  Z12.31       7. Screening for hyperlipidemia  Z13.220 Lipid panel reflex to direct LDL Non-fasting     Lipid panel reflex to direct LDL Non-fasting      8. Tobacco abuse  Z72.0 BASIC METABOLIC PANEL     buPROPion (WELLBUTRIN XL) 150 MG 24 hr tablet " "    BASIC METABOLIC PANEL          Patient has been advised of split billing requirements and indicates understanding: Yes    She was counseled regarding necessary medical testing, diet changes, and physical activity increases, and lifestyle changes that could positively affect her weight, possible hyperlipidemia, and tobacco use. Labs were ordered and will be completed today. She was prescribed bupropion for smoking cessation. She was educated regarding menopause symptoms, and the benefits and risks of the management and treatment of those symptoms. This includes medications, exercise, and diet changes. I reviewed her mammograms from 2016, 2019, and 2022. Her 2016 and 2022 mammograms revealed cysts in different areas. I explained the recommended additional testing to her. She will consider additional breast imaging at this time. She was educated regarding colon cancer risks and colonoscopy prep instructions. Colonoscopy ordered. She was counseled on vaccines for COVID-19, pneumonia, shingles, influenza, and Hepatitis B. She previously had Hepatitis B vaccine, agreed to having pneumonia and shingles vaccines today, and declined the remaining vaccines.    COUNSELING:  Reviewed preventive health counseling, as reflected in patient instructions  Special attention given to:        Regular exercise       Healthy diet/nutrition       Pneumococcal Vaccine        Alcohol Use       Colorectal Cancer Screening       (Jo)menopause management       Consider lung cancer screening for ages 55-80 years (77 for Medicare) and 20 pack-year smoking history       BMI:   Estimated body mass index is 29.68 kg/m  as calculated from the following:    Height as of this encounter: 1.695 m (5' 6.73\").    Weight as of this encounter: 85.3 kg (188 lb).   Weight management plan: Discussed healthy diet and exercise guidelines      She reports that she has been smoking cigarettes. She has a 20.00 pack-year smoking history. She has never used " smokeless tobacco.  Nicotine/Tobacco Cessation Plan:   Information offered: Patient not interested at this time      Sejal Garcia MD, MD  [facility]  Answers for HPI/ROS submitted by the patient on 5/1/2023  If you checked off any problems, how difficult have these problems made it for you to do your work, take care of things at home, or get along with other people?: Somewhat difficult  PHQ9 TOTAL SCORE: 17

## 2023-05-23 ENCOUNTER — TELEPHONE (OUTPATIENT)
Dept: GASTROENTEROLOGY | Facility: CLINIC | Age: 53
End: 2023-05-23
Payer: COMMERCIAL

## 2023-05-23 NOTE — TELEPHONE ENCOUNTER
Screening Questions  BLUE  KIND OF PREP RED  LOCATION [review exclusion criteria] GREEN  SEDATION TYPE        y Are you active on mychart?       Jose Ordering/Referring Provider?        [employer] What type of coverage do you have?      n Have you had a positive covid test in the last 14 days?     30.3 1. BMI  [BMI 40+ - review exclusion criteria& smart-phrase document]    y  2. Are you able to give consent for your medical care? [IF NO,RN REVIEW]          n  3. Are you taking any prescription pain medications on a routine schedule   (ex narcotics: oxycodone, roxicodone, oxycontin,  and percocet)? [RN Review]        n  3a. EXTENDED PREP What kind of prescription?     n 4. Do you have any chemical dependencies such as alcohol, street drugs, or methadone?        **If yes 3- 5 , please schedule with MAC sedation.**          IF YES TO ANY 6 - 10 - HOSPITAL SETTING ONLY.     n 6.   Do you need assistance transferring?     n 7.   Have you had a heart or lung transplant?    n 8.   Are you currently on dialysis?   n 9.   Do you use daily home oxygen?   n 10. Do you take nitroglycerin?   10a. n If yes, how often?     n 11. Are you currently pregnant?    11a. n If yes, how many weeks? [ Greater than 12 weeks, OR NEEDED]    n 12. Do you have Pulmonary Hypertension? *NEED PAC APPT AT [facility] w/ MAC*     n 13. [review exclusion criteria]  Do you have any implantable devices in your body (pacemaker, defib, LVAD)?    n 14. In the past 6 months, have you had any heart related issues including cardiomyopathy or heart attack?     14a. n If yes, did it require cardiac stenting if so when?     n 15. Have you had a stroke or Transient ischemic attack (TIA - aka  mini stroke ) within 6 months?      n 16. Do you have mod to severe Obstructive Sleep Apnea?  [Hospital only]    n 17. Do you have SEVERE AND UNCONTROLLED asthma? *NEED PAC APPT AT [facility] w/MAC*     18.Do you take blood thinners?  No    n 19. Do you take any of the following  "medications?    Phentermine    Ozempic    Wegovy (Semaglutide)      19a. If yes, \"Hold for 7 days before procedure.  Please consult your prescribing provider if you have questions about holding this medication.\"     n  20. Do you have chronic kidney disease?      n  21. Do you have a diagnosis of diabetes?     n  22. On a regular basis do you go 3-5 days between bowel movements?      23. Preferred [facility] for Pre Prescription         [id number] [address]        - CLOSING REMINDERS -    You will receive a call from a Nurse to review instructions and health history.  This assessment must be completed prior to your procedure.  Failure to complete the Nurse assessment may result in the procedure being cancelled.      On the day of your procedure, please designatean adult(s) who can drive you home stay with you for the next 24 hours. The medicines used in the exam will make you sleepy. You will not be able to drive.      You cannot take public transportation, ride share services, or non-medical taxi service without a responsible caregiver.  Medical transport services are allowed with the requirement that a responsible caregiver will receive you at your destination.  We require that drivers and caregivers are confirmed prior to your procedure.      - SCHEDULING DETAILS -  n &  Hospital Setting Required & If yes, what is the exclusion?   Ratna  Surgeon    7/24  Date of Procedure  Lower Endoscopy [Colonoscopy]  Type of Procedure Scheduled  [facility]   MIRALAX GATORADE WITHOUT MAGNEISUM CITRATE Which Colonoscopy Prep was Sent?     MAC Sedation Type     n PAC / Pre-op Required                 "

## 2023-06-03 ENCOUNTER — HEALTH MAINTENANCE LETTER (OUTPATIENT)
Age: 53
End: 2023-06-03

## 2023-06-28 ENCOUNTER — ANCILLARY PROCEDURE (OUTPATIENT)
Dept: MAMMOGRAPHY | Facility: OTHER | Age: 53
End: 2023-06-28
Attending: FAMILY MEDICINE
Payer: COMMERCIAL

## 2023-06-28 DIAGNOSIS — Z00.00 ROUTINE GENERAL MEDICAL EXAMINATION AT A HEALTH CARE FACILITY: ICD-10-CM

## 2023-06-28 PROCEDURE — 77063 BREAST TOMOSYNTHESIS BI: CPT | Mod: TC | Performed by: RADIOLOGY

## 2023-06-28 PROCEDURE — 77067 SCR MAMMO BI INCL CAD: CPT | Mod: TC | Performed by: RADIOLOGY

## 2023-07-21 NOTE — H&P
[facility] History and Physical    Mercedes Segal MRN# [id number]   Age: 53 year old YOB: 1970     Date of Admission:  (Not on file)    Home clinic: [facility]  Primary care provider: Sejal Garcia          Impression and Plan:   Impression:   Screen for colon cancer [Z12.11]  No prior      Plan:   Proceed to Colonoscopy as planned.  The procedure, risks(bleeding, perforation), benefits and alternatives were discussed and the patient agrees to proceed. Cleared for Anesthesia             Chief Complaint:   Screen for colon cancer [Z12.11]    History is obtained from the patient          History of Present Illness:   This 53 year old female is being seen at this time for evaluation for colonoscopy.  No complaints or family hx           Past Medical History:     Past Medical History:   Diagnosis Date     History of gestational diabetes 2012     Tobacco use disorder             Past Surgical History:     Past Surgical History:   Procedure Laterality Date      SECTION       CHOLECYSTECTOMY  2017     DILATION AND CURETTAGE       HC TOOTH EXTRACTION W/FORCEP      [facility]      TONSILLECTOMY       TUBAL LIGATION              Social History:     Social History     Tobacco Use     Smoking status: Every Day     Packs/day: 1.00     Years: 20.00     Pack years: 20.00     Types: Cigarettes     Smokeless tobacco: Never   Substance Use Topics     Alcohol use: Yes     Comment: rare            Family History:     Family History   Problem Relation Age of Onset     Other - See Comments Mother         Schizophrenia     Leukemia Mother         CLL     Osteoporosis Mother      Cancer Father         Lung Cancer     Cancer Brother         Leukemia     Asthma Other         nephew     Thyroid Disease Other         niece     Hypertension Maternal Aunt             Immunizations:     VACCINE/DOSE   Diptheria   DPT   DTAP   HBIG   Hepatitis A   Hepatitis B   HIB   Influenza    Measles   Meningococcal   MMR   Mumps   Pneumococcal   Polio   Rubella   Small Pox   TDAP   Varicella   Zoster            Allergies:     Allergies   Allergen Reactions     Penicillins Rash     Pt was a baby            Medications:     No current facility-administered medications for this encounter.     Current Outpatient Medications   Medication Sig     buPROPion (WELLBUTRIN XL) 150 MG 24 hr tablet Take 1 tablet (150 mg) by mouth every morning     ibuprofen (ADVIL/MOTRIN) 800 MG tablet Take 1 tablet (800 mg) by mouth every 8 hours as needed for moderate pain For the next 1-2 weeks.     Misc Natural Products (OSTEO BI-FLEX TRIPLE STRENGTH PO) Take 1 tablet by mouth daily     multivitamin w/minerals (THERA-VIT-M) tablet Take 1 tablet by mouth daily (Patient not taking: Reported on 5/1/2023)     nicotine (NICODERM CQ) 14 MG/24HR 24 hr patch Place 1 patch onto the skin every 24 hours (Patient not taking: Reported on 5/9/2019)     nicotine (NICODERM CQ) 21 MG/24HR 24 hr patch Place 1 patch onto the skin every 24 hours (Patient not taking: Reported on 8/16/2022)             Review of Systems:   The review of systems was positive for the following findings.  None.  The remainder of the review of systems was unremarkable.          Physical Exam:   All vitals have been reviewed  Last menstrual period 02/19/2019, not currently breastfeeding.  No intake or output data in the 24 hours ending 07/21/23 0808  SHEENT examination revealed NC/AT, EOMI.  Examination of the chest revealed CTA.  Examination of the heart revealed RRR.  Examination of the abdomen revealed soft, non tender.  The neuromuscular examination was NL.          Data:   All laboratory data reviewed  No results found for any visits on 07/24/23.  -     Salvador Segundo MD, FACS

## 2023-07-24 ENCOUNTER — ANESTHESIA EVENT (OUTPATIENT)
Dept: GASTROENTEROLOGY | Facility: CLINIC | Age: 53
End: 2023-07-24
Payer: COMMERCIAL

## 2023-07-24 ENCOUNTER — ANESTHESIA (OUTPATIENT)
Dept: GASTROENTEROLOGY | Facility: CLINIC | Age: 53
End: 2023-07-24
Payer: COMMERCIAL

## 2023-07-24 ENCOUNTER — HOSPITAL ENCOUNTER (OUTPATIENT)
Facility: CLINIC | Age: 53
Discharge: HOME | End: 2023-07-24
Attending: SPECIALIST | Admitting: SPECIALIST
Payer: COMMERCIAL

## 2023-07-24 VITALS
RESPIRATION RATE: 18 BRPM | OXYGEN SATURATION: 97 % | TEMPERATURE: 98.1 F | HEART RATE: 72 BPM | SYSTOLIC BLOOD PRESSURE: 110 MMHG | DIASTOLIC BLOOD PRESSURE: 78 MMHG

## 2023-07-24 LAB — COLONOSCOPY: NORMAL

## 2023-07-24 PROCEDURE — 88305 TISSUE EXAM BY PATHOLOGIST: CPT | Mod: 26 | Performed by: PATHOLOGY

## 2023-07-24 PROCEDURE — 250N000009 HC RX 250: Performed by: NURSE ANESTHETIST, CERTIFIED REGISTERED

## 2023-07-24 PROCEDURE — 250N000009 HC RX 250: Performed by: SPECIALIST

## 2023-07-24 PROCEDURE — 258N000003 HC RX IP 258 OP 636: Performed by: NURSE ANESTHETIST, CERTIFIED REGISTERED

## 2023-07-24 PROCEDURE — 88305 TISSUE EXAM BY PATHOLOGIST: CPT | Mod: TC | Performed by: SPECIALIST

## 2023-07-24 PROCEDURE — 370N000017 HC ANESTHESIA TECHNICAL FEE, PER MIN: Performed by: SPECIALIST

## 2023-07-24 PROCEDURE — 250N000011 HC RX IP 250 OP 636: Performed by: NURSE ANESTHETIST, CERTIFIED REGISTERED

## 2023-07-24 PROCEDURE — 45385 COLONOSCOPY W/LESION REMOVAL: CPT | Mod: PT | Performed by: SPECIALIST

## 2023-07-24 PROCEDURE — 45380 COLONOSCOPY AND BIOPSY: CPT | Mod: PT | Performed by: SPECIALIST

## 2023-07-24 RX ORDER — PROPOFOL 10 MG/ML
INJECTION, EMULSION INTRAVENOUS CONTINUOUS PRN
Status: DISCONTINUED | OUTPATIENT
Start: 2023-07-24 | End: 2023-07-24

## 2023-07-24 RX ORDER — LIDOCAINE 40 MG/G
CREAM TOPICAL
Status: DISCONTINUED | OUTPATIENT
Start: 2023-07-24 | End: 2023-07-24 | Stop reason: HOSPADM

## 2023-07-24 RX ORDER — ONDANSETRON 4 MG/1
4 TABLET, ORALLY DISINTEGRATING ORAL EVERY 30 MIN PRN
Status: DISCONTINUED | OUTPATIENT
Start: 2023-07-24 | End: 2023-07-24 | Stop reason: HOSPADM

## 2023-07-24 RX ORDER — ONDANSETRON 2 MG/ML
4 INJECTION INTRAMUSCULAR; INTRAVENOUS EVERY 30 MIN PRN
Status: DISCONTINUED | OUTPATIENT
Start: 2023-07-24 | End: 2023-07-24 | Stop reason: HOSPADM

## 2023-07-24 RX ORDER — LIDOCAINE HYDROCHLORIDE 20 MG/ML
INJECTION, SOLUTION INFILTRATION; PERINEURAL PRN
Status: DISCONTINUED | OUTPATIENT
Start: 2023-07-24 | End: 2023-07-24

## 2023-07-24 RX ORDER — PROPOFOL 10 MG/ML
INJECTION, EMULSION INTRAVENOUS PRN
Status: DISCONTINUED | OUTPATIENT
Start: 2023-07-24 | End: 2023-07-24

## 2023-07-24 RX ORDER — SODIUM CHLORIDE, SODIUM LACTATE, POTASSIUM CHLORIDE, CALCIUM CHLORIDE 600; 310; 30; 20 MG/100ML; MG/100ML; MG/100ML; MG/100ML
INJECTION, SOLUTION INTRAVENOUS CONTINUOUS
Status: DISCONTINUED | OUTPATIENT
Start: 2023-07-24 | End: 2023-07-24 | Stop reason: HOSPADM

## 2023-07-24 RX ADMIN — PROPOFOL 200 MCG/KG/MIN: 10 INJECTION, EMULSION INTRAVENOUS at 09:02

## 2023-07-24 RX ADMIN — LIDOCAINE HYDROCHLORIDE 50 MG: 20 INJECTION, SOLUTION INFILTRATION; PERINEURAL at 09:02

## 2023-07-24 RX ADMIN — SODIUM CHLORIDE, POTASSIUM CHLORIDE, SODIUM LACTATE AND CALCIUM CHLORIDE: 600; 310; 30; 20 INJECTION, SOLUTION INTRAVENOUS at 08:37

## 2023-07-24 RX ADMIN — LIDOCAINE HYDROCHLORIDE 1 ML: 10 INJECTION, SOLUTION EPIDURAL; INFILTRATION; INTRACAUDAL; PERINEURAL at 08:37

## 2023-07-24 RX ADMIN — PROPOFOL 70 MG: 10 INJECTION, EMULSION INTRAVENOUS at 09:02

## 2023-07-24 ASSESSMENT — LIFESTYLE VARIABLES: TOBACCO_USE: 1

## 2023-07-24 ASSESSMENT — ACTIVITIES OF DAILY LIVING (ADL): ADLS_ACUITY_SCORE: 35

## 2023-07-24 NOTE — DISCHARGE INSTRUCTIONS
[facility]    Home Care Following Endoscopy          Activity:  You have just undergone an endoscopic procedure usually performed with conscious sedation.  Do not work or operate machinery (including a car) for at least 12 hours.    I encourage you to walk and attempt to pass this air as soon as possible.    Diet:  Return to the diet you were on before your procedure but eat lightly for the first 12-24 hours.  Drink plenty of water.  Resume any regular medications unless otherwise advised by your physician.  Please begin any new medication prescribed as a result of your procedure as directed by your physician.   If you had any biopsy or polyp removed please refrain from aspirin or aspirin products for 2 days.  If on Coumadin please restart as instructed by your physician.   Pain:  You may take Tylenol as needed for pain.  Expected Recovery:  You can expect some mild abdominal fullness and/or discomfort due to the air used to inflate your intestinal tract. It is also normal to have a mild sore throat after upper endoscopy.    Call Your Physician if You Have:  After Colonoscopy:  Worsening persisting abdominal pain which is worse with activity.  Fevers (>101 degrees F), chills or shakes.  Passage of continued blood with bowel movements.     Any questions or concerns about your recovery, please call [phone] or after hours [phone] ([phone]) Nurse Advice Line.    Follow-up Care:  IF YOU HAD polyps/biopsy tissue sample(s) removed.  The polyps/biopsy tissue sample(s) will be sent to pathology.    You should receive letter in your My Chart from with your results within 1-2 weeks. If you do not participate in My Chart a physical letter will come in the mail in 2-3 weeks.  Please call if you have not received a notification of your results.  If asked to return to clinic please make an appointment 1 week after your procedure.  Call [phone].

## 2023-07-24 NOTE — ANESTHESIA POSTPROCEDURE EVALUATION
Patient: Mercedes Segal    Procedure: Procedure(s):  Colonoscopy with polypectomy       Anesthesia Type:  MAC    Note:  Disposition: Outpatient   Postop Pain Control: Uneventful            Sign Out: Well controlled pain   PONV: No   Neuro/Psych: Uneventful            Sign Out: Acceptable/Baseline neuro status   Airway/Respiratory: Uneventful            Sign Out: Acceptable/Baseline resp. status   CV/Hemodynamics: Uneventful            Sign Out: Acceptable CV status   Other NRE: NONE   DID A NON-ROUTINE EVENT OCCUR? No    Event details/Postop Comments:  Pt was happy with anesthesia care.  No complications.  I will follow up with the pt if needed.           Last vitals:  Vitals Value Taken Time   BP 99/62 07/24/23 0925   Temp     Pulse 76 07/24/23 0925   Resp     SpO2 98 % 07/24/23 0929   Vitals shown include unvalidated device data.    Electronically Signed By: DANNY Obregon CRNA  July 24, 2023  9:30 AM

## 2023-07-24 NOTE — ANESTHESIA PREPROCEDURE EVALUATION
Anesthesia Pre-Procedure Evaluation    Patient: Mercedes Segal   MRN: [id number] : 1970        Procedure : Procedure(s):  Colonoscopy          Past Medical History:   Diagnosis Date    History of gestational diabetes 2012    Tobacco use disorder       Past Surgical History:   Procedure Laterality Date     SECTION      CHOLECYSTECTOMY  2017    DILATION AND CURETTAGE      HC TOOTH EXTRACTION W/FORCEP      [facility]     TONSILLECTOMY      TUBAL LIGATION        Allergies   Allergen Reactions    Penicillins Rash     Pt was a baby      Social History     Tobacco Use    Smoking status: Every Day     Packs/day: 1.00     Years: 20.00     Pack years: 20.00     Types: Cigarettes    Smokeless tobacco: Never   Substance Use Topics    Alcohol use: Yes     Comment: rare      Wt Readings from Last 1 Encounters:   23 85.3 kg (188 lb)        Anesthesia Evaluation   Pt has had prior anesthetic. Type: MAC.    No history of anesthetic complications       ROS/MED HX  ENT/Pulmonary:     (+)                tobacco use, Current use,                      Neurologic:  - neg neurologic ROS     Cardiovascular:  - neg cardiovascular ROS   (+)  - -   -  - -                                 No previous cardiac testing     METS/Exercise Tolerance:     Hematologic:  - neg hematologic  ROS     Musculoskeletal:  - neg musculoskeletal ROS     GI/Hepatic:  - neg GI/hepatic ROS  (-) GERD   Renal/Genitourinary:  - neg Renal ROS     Endo:  - neg endo ROS     Psychiatric/Substance Use:  - neg psychiatric ROS     Infectious Disease:  - neg infectious disease ROS     Malignancy:  - neg malignancy ROS     Other:  - neg other ROS          Physical Exam    Airway        Mallampati: II   TM distance: > 3 FB   Neck ROM: full   Mouth opening: > 3 cm    Respiratory Devices and Support         Dental       (+) Minor Abnormalities - some fillings, tiny chips      Cardiovascular   cardiovascular exam normal       Rhythm and rate:  regular and normal     Pulmonary   pulmonary exam normal        breath sounds clear to auscultation           OUTSIDE LABS:  CBC:   Lab Results   Component Value Date    WBC 10.2 01/23/2018    WBC 9.6 01/18/2017    HGB 13.8 01/23/2018    HGB 13.2 01/18/2017    HCT 40.9 01/23/2018    HCT 39.3 01/18/2017     01/23/2018     01/18/2017     BMP:   Lab Results   Component Value Date     05/01/2023     11/09/2020    POTASSIUM 4.0 05/01/2023    POTASSIUM 4.1 11/09/2020    CHLORIDE 103 05/01/2023    CHLORIDE 111 (H) 11/09/2020    CO2 24 05/01/2023    CO2 24 11/09/2020    BUN 17.3 05/01/2023    BUN 21 11/09/2020    CR 0.84 05/01/2023    CR 0.81 11/09/2020    GLC 92 05/01/2023    GLC 98 11/09/2020     COAGS: No results found for: PTT, INR, FIBR  POC: No results found for: BGM, HCG, HCGS  HEPATIC:   Lab Results   Component Value Date    ALBUMIN 3.8 08/29/2019    PROTTOTAL 7.1 08/29/2019    ALT 25 08/29/2019    AST 16 08/29/2019    ALKPHOS 69 08/29/2019    BILITOTAL 0.4 08/29/2019     OTHER:   Lab Results   Component Value Date    A1C 5.4 05/01/2023    ELGIN 9.4 05/01/2023    LIPASE 142 01/18/2017    TSH 1.92 05/13/2021       Anesthesia Plan    ASA Status:  2    NPO Status:  NPO Appropriate    Anesthesia Type: MAC.     - Reason for MAC: straight local not clinically adequate   Induction: Intravenous, Propofol.   Maintenance: TIVA.        Consents    Anesthesia Plan(s) and associated risks, benefits, and realistic alternatives discussed. Questions answered and patient/representative(s) expressed understanding.     - Discussed:     - Discussed with:  Patient      - Extended Intubation/Ventilatory Support Discussed: No.      - Patient is DNR/DNI Status: No     Use of blood products discussed: No .     Postoperative Care       PONV prophylaxis: Background Propofol Infusion     Comments:    Other Comments: The risks and benefits of anesthesia, and the alternatives where applicable, have been discussed with the  patient, and they wish to proceed.               DANNY Obregon CRNA

## 2023-07-25 LAB
PATH REPORT.COMMENTS IMP SPEC: NORMAL
PATH REPORT.COMMENTS IMP SPEC: NORMAL
PATH REPORT.FINAL DX SPEC: NORMAL
PATH REPORT.GROSS SPEC: NORMAL
PATH REPORT.MICROSCOPIC SPEC OTHER STN: NORMAL
PATH REPORT.RELEVANT HX SPEC: NORMAL
PHOTO IMAGE: NORMAL

## 2024-04-01 ENCOUNTER — PATIENT OUTREACH (OUTPATIENT)
Dept: CARE COORDINATION | Facility: CLINIC | Age: 54
End: 2024-04-01
Payer: COMMERCIAL

## 2024-04-15 ENCOUNTER — PATIENT OUTREACH (OUTPATIENT)
Dept: CARE COORDINATION | Facility: CLINIC | Age: 54
End: 2024-04-15
Payer: COMMERCIAL

## 2024-05-29 ENCOUNTER — PATIENT OUTREACH (OUTPATIENT)
Dept: CARE COORDINATION | Facility: CLINIC | Age: 54
End: 2024-05-29
Payer: COMMERCIAL

## 2024-06-26 ENCOUNTER — PATIENT OUTREACH (OUTPATIENT)
Dept: CARE COORDINATION | Facility: CLINIC | Age: 54
End: 2024-06-26
Payer: COMMERCIAL

## 2024-07-06 ENCOUNTER — HEALTH MAINTENANCE LETTER (OUTPATIENT)
Age: 54
End: 2024-07-06

## 2024-09-14 ENCOUNTER — HEALTH MAINTENANCE LETTER (OUTPATIENT)
Age: 54
End: 2024-09-14

## 2024-10-28 ENCOUNTER — PATIENT OUTREACH (OUTPATIENT)
Dept: CARE COORDINATION | Facility: CLINIC | Age: 54
End: 2024-10-28
Payer: COMMERCIAL

## 2024-12-25 ENCOUNTER — PATIENT OUTREACH (OUTPATIENT)
Dept: CARE COORDINATION | Facility: CLINIC | Age: 54
End: 2024-12-25
Payer: COMMERCIAL

## 2025-01-08 NOTE — ANESTHESIA CARE TRANSFER NOTE
Patient: Mercedes Segal    Procedure: Procedure(s):  Colonoscopy with polypectomy       Diagnosis: Screen for colon cancer [Z12.11]  Diagnosis Additional Information: No value filed.    Anesthesia Type:   MAC     Note:    Oropharynx: oropharynx clear of all foreign objects and spontaneously breathing  Level of Consciousness: drowsy  Oxygen Supplementation: face mask    Independent Airway: airway patency satisfactory and stable  Dentition: dentition unchanged  Vital Signs Stable: post-procedure vital signs reviewed and stable  Report to RN Given: handoff report given  Patient transferred to: Phase II    Handoff Report: Identifed the Patient, Identified the Reponsible Provider, Reviewed the pertinent medical history, Discussed the surgical course, Reviewed Intra-OP anesthesia mangement and issues during anesthesia, Set expectations for post-procedure period and Allowed opportunity for questions and acknowledgement of understanding      Vitals:  Vitals Value Taken Time   BP 99/62 07/24/23 0925   Temp     Pulse 76 07/24/23 0925   Resp     SpO2 98 % 07/24/23 0928   Vitals shown include unvalidated device data.    Electronically Signed By: DANNY Obregon CRNA  July 24, 2023  9:29 AM   [FreeTextEntry1] : 52 year old female here for an initial consultation regarding her newly diagnosed left breast DCIS. She was referred by her PCP Dr. Keisha Phoenix Neuro: Dr. Nabil Thomas Rheum: Dr. Yoanna Carrillo Pain Management: Dr. 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 Myalgias controlled better with medical marijuana prescribed by pain management.  Family history of breast cancer in her distant maternal cousin diagnosed at age 65; her mother had ovarian cancer (in her 60's; she passed 65) She has a history of right MRI guided biopsy - benign and right stereotactic guided biopsy in 2020 - benign. 12/11/2023 Bilateral mammogram: [facility] Lifetime Risk: 20.6%. There are scattered areas of fibroglandular density.  There are 2 groups of indeterminate calcifications in the central left breast and outer posterior left breast for which additional imaging is recommended. No suspicious mass, suspicious microcalcifications, or other sign of malignancy is identified in the right breast. Stable appearance of biopsy markers in the upper outer right breast. Additional benign type calcifications are noted bilaterally. Stable nodular parenchymal pattern. BI-RADS 0, advise additional mammographic views of the left breast. 12/16/2023 Left diagnostic mammogram: No suspicious mass, suspicious microcalcifications, or other sign of malignancy is identified. The calcifications in the central lower breast appear loosely spaced and fairly round and stable back to at least 2020. The calcifications in the outer posterior breast also appear loosely spaced and benign and stable. BI-RADS 2 12/4/2024 Bilateral screening mammogram: [facility] Lifetime Risk: 20.3%. There are scattered areas of fibroglandular density.  There is a developing group of microcalcifications in linear distribution, in the lower inner quadrant of the LEFT breast posterior depth. Approximately 4 cm anterior to this group, an additional group of microcalcifications is visualized and suspected to be related to the same process. 2 additional groups of microcalcifications, one in the upper outer quadrant mid to posterior depth and the other in the medial LEFT breast anterior depth have shown mammographic stability since 2021 and 2020, respectively. A developing focal asymmetry is visualized in the upper inner quadrant of the LEFT breast, middle depth approximately 10 cm from the nipple (CC image 2, 41/79 and MLO image 3, 43/80. BI-RADS 0, advise diagnostic left mammography and possible left ultrasound. No suspicious mass, suspicious microcalcifications, or other sign of malignancy is identified in the RIGHT breast. 2 stable biopsy clips upper outer RIGHT breast. BREAST ARTERIAL CALCIFICATION (GRACIELA): Grade 0 - No vascular calcifications. Note: The absence of breast arterial calcification does not exclude cardiovascular disease. Management of cardiovascular risk factors should be based clinically.  12/16/2024 Left diagnostic mammogram: In the slightly upper inner breast posteriorly is a newly seen grouping of calcifications arranged in a linear configuration. A stereotactic biopsy is recommended for further evaluation. A second smaller calcifications is visualized anteriorly. Multiple scattered and loosely spaced calcifications visualized. The focal asymmetry in the upper inner breast is less notable on additional imaging likely overlapping glandular tissue. Left breast targeted ultrasound: No suspicious solid mass. BI-RADS 4B, left stereotactic biopsy is recommended for further evaluation. A smaller grouping of calcifications is visualized anteriorly. Management will be based on biopsy results. Follow-up mammogram of the left breast in 6 months is recommended. 12/20/2024 Left medial breast stereotactic core biopsy (top hat clip).  Pathlogy: - DUCTAL CARCINOMA IN SITU (DCIS), SOLID TYPE WITH INTERMEDIATE TO HIGH NUCLEAR GRADE AND CENTRAL NECROSIS - CALCIFICATIONS IDENTIFIED IN DCIS AND BENIGN EPITHELIUM -  ER+(>95%)/CA+(>90%) Results are CONCORDANT. Please note that additional suspicious calcifications are noted up to 3.0 cm anterior to the biopsy site and inferior from the biopsy site on the 90 degree lateral view. Surgical consultation is advised. If it would alter management, the patient may need additional biopsies. She is here alone today. She has a son, age 23, and her sister, who both live with her. She has a fiance. She comes in considering bilateral mastectomies

## 2025-04-10 ENCOUNTER — OFFICE VISIT (OUTPATIENT)
Dept: FAMILY MEDICINE | Facility: OTHER | Age: 55
End: 2025-04-10
Payer: COMMERCIAL

## 2025-04-10 VITALS
WEIGHT: 186.5 LBS | RESPIRATION RATE: 15 BRPM | TEMPERATURE: 97.6 F | SYSTOLIC BLOOD PRESSURE: 113 MMHG | OXYGEN SATURATION: 97 % | BODY MASS INDEX: 29.27 KG/M2 | DIASTOLIC BLOOD PRESSURE: 74 MMHG | HEART RATE: 81 BPM | HEIGHT: 67 IN

## 2025-04-10 DIAGNOSIS — Z13.1 SCREENING FOR DIABETES MELLITUS: ICD-10-CM

## 2025-04-10 DIAGNOSIS — K59.1 FUNCTIONAL DIARRHEA: ICD-10-CM

## 2025-04-10 DIAGNOSIS — N95.1 MENOPAUSAL SYNDROME (HOT FLASHES): ICD-10-CM

## 2025-04-10 DIAGNOSIS — Z00.00 ROUTINE GENERAL MEDICAL EXAMINATION AT A HEALTH CARE FACILITY: Primary | ICD-10-CM

## 2025-04-10 DIAGNOSIS — E78.2 MIXED HYPERLIPIDEMIA: ICD-10-CM

## 2025-04-10 DIAGNOSIS — Z12.31 VISIT FOR SCREENING MAMMOGRAM: ICD-10-CM

## 2025-04-10 DIAGNOSIS — Z72.0 TOBACCO ABUSE: ICD-10-CM

## 2025-04-10 DIAGNOSIS — Z12.4 CERVICAL CANCER SCREENING: ICD-10-CM

## 2025-04-10 LAB
ANION GAP SERPL CALCULATED.3IONS-SCNC: 10 MMOL/L (ref 7–15)
BUN SERPL-MCNC: 11.8 MG/DL (ref 6–20)
CALCIUM SERPL-MCNC: 9.3 MG/DL (ref 8.8–10.4)
CHLORIDE SERPL-SCNC: 104 MMOL/L (ref 98–107)
CHOLEST SERPL-MCNC: 203 MG/DL
CREAT SERPL-MCNC: 0.88 MG/DL (ref 0.51–0.95)
EGFRCR SERPLBLD CKD-EPI 2021: 78 ML/MIN/1.73M2
FASTING STATUS PATIENT QL REPORTED: YES
FASTING STATUS PATIENT QL REPORTED: YES
GLUCOSE SERPL-MCNC: 98 MG/DL (ref 70–99)
HCO3 SERPL-SCNC: 26 MMOL/L (ref 22–29)
HDLC SERPL-MCNC: 55 MG/DL
LDLC SERPL CALC-MCNC: 126 MG/DL
NONHDLC SERPL-MCNC: 148 MG/DL
POTASSIUM SERPL-SCNC: 4.4 MMOL/L (ref 3.4–5.3)
SODIUM SERPL-SCNC: 140 MMOL/L (ref 135–145)
TRIGL SERPL-MCNC: 109 MG/DL

## 2025-04-10 SDOH — HEALTH STABILITY: PHYSICAL HEALTH: ON AVERAGE, HOW MANY MINUTES DO YOU ENGAGE IN EXERCISE AT THIS LEVEL?: 10 MIN

## 2025-04-10 SDOH — HEALTH STABILITY: PHYSICAL HEALTH: ON AVERAGE, HOW MANY DAYS PER WEEK DO YOU ENGAGE IN MODERATE TO STRENUOUS EXERCISE (LIKE A BRISK WALK)?: 1 DAY

## 2025-04-10 ASSESSMENT — SOCIAL DETERMINANTS OF HEALTH (SDOH): HOW OFTEN DO YOU GET TOGETHER WITH FRIENDS OR RELATIVES?: ONCE A WEEK

## 2025-04-10 ASSESSMENT — PAIN SCALES - GENERAL: PAINLEVEL_OUTOF10: NO PAIN (0)

## 2025-04-10 NOTE — PATIENT INSTRUCTIONS
Patient Education   Preventive Care Advice   This is general advice given by our system to help you stay healthy. However, your care team may have specific advice just for you. Please talk to your care team about your preventive care needs.  Nutrition  Eat 5 or more servings of fruits and vegetables each day.  Try wheat bread, brown rice and whole grain pasta (instead of white bread, rice, and pasta).  Get enough calcium and vitamin D. Check the label on foods and aim for 100% of the RDA (recommended daily allowance).  Lifestyle  Exercise at least 150 minutes each week  (30 minutes a day, 5 days a week).  Do muscle strengthening activities 2 days a week. These help control your weight and prevent disease.  No smoking.  Wear sunscreen to prevent skin cancer.  Have a dental exam and cleaning every 6 months.  Yearly exams  See your health care team every year to talk about:  Any changes in your health.  Any medicines your care team has prescribed.  Preventive care, family planning, and ways to prevent chronic diseases.  Shots (vaccines)   HPV shots (up to age 26), if you've never had them before.  Hepatitis B shots (up to age 59), if you've never had them before.  COVID-19 shot: Get this shot when it's due.  Flu shot: Get a flu shot every year.  Tetanus shot: Get a tetanus shot every 10 years.  Pneumococcal, hepatitis A, and RSV shots: Ask your care team if you need these based on your risk.  Shingles shot (for age 50 and up)  General health tests  Diabetes screening:  Starting at age 35, Get screened for diabetes at least every 3 years.  If you are younger than age 35, ask your care team if you should be screened for diabetes.  Cholesterol test: At age 39, start having a cholesterol test every 5 years, or more often if advised.  Bone density scan (DEXA): At age 50, ask your care team if you should have this scan for osteoporosis (brittle bones).  Hepatitis C: Get tested at least once in your life.  STIs (sexually  transmitted infections)  Before age 24: Ask your care team if you should be screened for STIs.  After age 24: Get screened for STIs if you're at risk. You are at risk for STIs (including HIV) if:  You are sexually active with more than one person.  You don't use condoms every time.  You or a partner was diagnosed with a sexually transmitted infection.  If you are at risk for HIV, ask about PrEP medicine to prevent HIV.  Get tested for HIV at least once in your life, whether you are at risk for HIV or not.  Cancer screening tests  Cervical cancer screening: If you have a cervix, begin getting regular cervical cancer screening tests starting at age 21.  Breast cancer scan (mammogram): If you've ever had breasts, begin having regular mammograms starting at age 40. This is a scan to check for breast cancer.  Colon cancer screening: It is important to start screening for colon cancer at age 45.  Have a colonoscopy test every 10 years (or more often if you're at risk) Or, ask your provider about stool tests like a FIT test every year or Cologuard test every 3 years.  To learn more about your testing options, visit:   .  For help making a decision, visit:   https://bit.ly/sh07280.  Prostate cancer screening test: If you have a prostate, ask your care team if a prostate cancer screening test (PSA) at age 55 is right for you.  Lung cancer screening: If you are a current or former smoker ages 50 to 80, ask your care team if ongoing lung cancer screenings are right for you.  For informational purposes only. Not to replace the advice of your health care provider. Copyright   2023 [facility]. All rights reserved. Clinically reviewed by the [facility] Transitions Program. [employer] [id number] - REV 01/24.  Learning About Stress  What is stress?     Stress is your body's response to a hard situation. Your body can have a physical, emotional, or mental response. Stress is a fact of life for most people, and it  affects everyone differently. What causes stress for you may not be stressful for someone else.  A lot of things can cause stress. You may feel stress when you go on a job interview, take a test, or run a race. This kind of short-term stress is normal and even useful. It can help you if you need to work hard or react quickly. For example, stress can help you finish an important job on time.  Long-term stress is caused by ongoing stressful situations or events. Examples of long-term stress include long-term health problems, ongoing problems at work, or conflicts in your family. Long-term stress can harm your health.  How does stress affect your health?  When you are stressed, your body responds as though you are in danger. It makes hormones that speed up your heart, make you breathe faster, and give you a burst of energy. This is called the fight-or-flight stress response. If the stress is over quickly, your body goes back to normal and no harm is done.  But if stress happens too often or lasts too long, it can have bad effects. Long-term stress can make you more likely to get sick, and it can make symptoms of some diseases worse. If you tense up when you are stressed, you may develop neck, shoulder, or low back pain. Stress is linked to high blood pressure and heart disease.  Stress also harms your emotional health. It can make you wilkes, tense, or depressed. Your relationships may suffer, and you may not do well at work or school.  What can you do to manage stress?  You can try these things to help manage stress:   Do something active. Exercise or activity can help reduce stress. Walking is a great way to get started. Even everyday activities such as housecleaning or yard work can help.  Try yoga or jillian chi. These techniques combine exercise and meditation. You may need some training at first to learn them.  Do something you enjoy. For example, listen to music or go to a movie. Practice your hobby or do volunteer  "work.  Meditate. This can help you relax, because you are not worrying about what happened before or what may happen in the future.  Do guided imagery. Imagine yourself in any setting that helps you feel calm. You can use online videos, books, or a teacher to guide you.  Do breathing exercises. For example:  From a standing position, bend forward from the waist with your knees slightly bent. Let your arms dangle close to the floor.  Breathe in slowly and deeply as you return to a standing position. Roll up slowly and lift your head last.  Hold your breath for just a few seconds in the standing position.  Breathe out slowly and bend forward from the waist.  Let your feelings out. Talk, laugh, cry, and express anger when you need to. Talking with supportive friends or family, a counselor, or a judi leader about your feelings is a healthy way to relieve stress. Avoid discussing your feelings with people who make you feel worse.  Write. It may help to write about things that are bothering you. This helps you find out how much stress you feel and what is causing it. When you know this, you can find better ways to cope.  What can you do to prevent stress?  You might try some of these things to help prevent stress:  Manage your time. This helps you find time to do the things you want and need to do.  Get enough sleep. Your body recovers from the stresses of the day while you are sleeping.  Get support. Your family, friends, and community can make a difference in how you experience stress.  Limit your news feed. Avoid or limit time on social media or news that may make you feel stressed.  Do something active. Exercise or activity can help reduce stress. Walking is a great way to get started.  Where can you learn more?  Go to https://www.[employer].net/patiented  Enter N032 in the search box to learn more about \"Learning About Stress.\"  Current as of: October 24, 2024  Content Version: 14.4 2024-2025 [employer].   Care instructions adapted under license by your healthcare professional. If you have questions about a medical condition or this instruction, always ask your healthcare professional. [employer] disclaims any warranty or liability for your use of this information.

## 2025-04-10 NOTE — PROGRESS NOTES
Preventive Care Visit  [facility]  Sejal Garcia MD, MD, Family Medicine  Apr 10, 2025      Assessment & Plan         ICD-10-CM    1. Routine general medical examination at a health care facility  Z00.00 MA Screening Bilateral w/ Bob      2. Cervical cancer screening  Z12.4 HPV and Gynecologic Cytology Panel - Recommended Age 30 - 65 Years      3. Visit for screening mammogram  Z12.31       4. Menopausal syndrome (hot flashes)  N95.1       5. Tobacco abuse  Z72.0       6. Mixed hyperlipidemia  E78.2 Lipid panel reflex to direct LDL Non-fasting     Lipid panel reflex to direct LDL Non-fasting      7. Screening for diabetes mellitus  Z13.1 BASIC METABOLIC PANEL     BASIC METABOLIC PANEL          Consent was obtained from the patient to use an AI documentation tool in the creation of this note.    Assessment & Plan  Cervical cancer screening:  - Last Pap smear was in 2019; due for screening.  - Perform Pap smear during this visit.    Visit for screening mammogram:  - Mammogram screening is due.  - Schedule mammogram through [facility].    Menopausal syndrome (hot flashes):  - Hot flashes noted, particularly at night.  - Discussed lifestyle modifications such as using fans, wearing cotton, and avoiding excessive carbs and soy. Consider black cohosh or hormone therapy or SSRI/SSNI if needed.  Discussed risks/benefits and she is not interested in this at this time. Has used some black cohash with benefit and considering restarting this.    Tobacco abuse:  - Discussed challenges and strategies for quitting smoking.  - Offer support through [facility] [place] and discuss tobacco craving medications if patient decides to quit. Had wellbutrin, but needs to work on prep and planning as did not have motivation to change the habit.    Mixed hyperlipidemia:  - Conduct fasting labs to check cholesterol levels.  - Discuss dietary changes to manage cholesterol, including increasing fiber intake and reducing  "saturated fats.    Screening for diabetes mellitus:  - Conduct fasting labs for diabetes screening.  - Discuss lifestyle modifications to prevent diabetes, such as maintaining a healthy weight and regular physical activity.    Diarrhea  - likely needs to lower fat intake and increase fiber to help. but worse with stress, so may need stress relieving techniques as well. If not helpful for these diet changes, may consider referral to GI      I spent a total of 39 minutes on the day of the visit.   Time spent by me today doing chart review, history and exam, documentation and further activities per the note    Sejal Garcia MD     Patient has been advised of split billing requirements and indicates understanding: Yes        Nicotine/Tobacco Cessation  She reports that she has been smoking cigarettes. She has a 20 pack-year smoking history. She has never used smokeless tobacco.  Nicotine/Tobacco Cessation Plan  Information offered: Patient not interested at this time      BMI  Estimated body mass index is 29.45 kg/m  as calculated from the following:    Height as of this encounter: 1.695 m (5' 6.73\").    Weight as of this encounter: 84.6 kg (186 lb 8 oz).   Weight management plan: Discussed healthy diet and exercise guidelines    Counseling  Appropriate preventive services were addressed with this patient via screening, questionnaire, or discussion as appropriate for fall prevention, nutrition, physical activity, Tobacco-use cessation, social engagement, weight loss and cognition.  Checklist reviewing preventive services available has been given to the patient.  Reviewed patient's diet, addressing concerns and/or questions.   She is at risk for lack of exercise and has been provided with information to increase physical activity for the benefit of her well-being.   She is at risk for psychosocial distress and has been provided with information to reduce risk.           Britta Long is a 55 year old, presenting for the " following:  Physical        4/10/2025     6:52 AM   Additional Questions   Roomed by chirag   Accompanied by self        Via the Health Maintenance questionnaire, the patient has reported the following services have been completed -Mammogram: [facility] 2023-05-18, this information has not been sent to the abstraction team.    HPI  Vitamin questions, Would like Blood work - Experiences diarrhea every day, possibly related to gallbladder removal in 2017. though seems to worsen since menopause.  - Had a large 8 cm polyp removed in 2023  - Reports waking up at night due to feeling hot, not sweating, possibly related to menopause.  - Previously took black cohosh for hot flashes, which have mostly resolved except for nighttime heat.        Advance Care Planning  Patient does not have a Health Care Directive: Discussed advance care planning with patient; information given to patient to review.      4/10/2025   General Health   How would you rate your overall physical health? (!) FAIR   Feel stress (tense, anxious, or unable to sleep) Very much   (!) STRESS CONCERN      4/10/2025   Nutrition   Three or more servings of calcium each day? (!) I DON'T KNOW   Diet: Regular (no restrictions)   How many servings of fruit and vegetables per day? (!) I DON'T KNOW   How many sweetened beverages each day? (!) 2         4/10/2025   Exercise   Days per week of moderate/strenous exercise 1 day   Average minutes spent exercising at this level 10 min   (!) EXERCISE CONCERN      4/10/2025   Social Factors   Frequency of gathering with friends or relatives Once a week   Worry food won't last until get money to buy more Patient declined   Food not last or not have enough money for food? No   Do you have housing? (Housing is defined as stable permanent housing and does not include staying ouside in a car, in a tent, in an abandoned building, in an overnight shelter, or couch-surfing.) Yes   Are you worried about losing your housing? No   Lack of  transportation? No   Unable to get utilities (heat,electricity)? No         4/10/2025   Fall Risk   Fallen 2 or more times in the past year? No   Trouble with walking or balance? No          4/10/2025   Dental   Dentist two times every year? Yes           Today's PHQ-2 Score:       4/10/2025     6:46 AM   PHQ-2 ( 1999 [employer])   Q1: Little interest or pleasure in doing things 1   Q2: Feeling down, depressed or hopeless 1   PHQ-2 Score 2    Q1: Little interest or pleasure in doing things Several days   Q2: Feeling down, depressed or hopeless Several days   PHQ-2 Score 2       Patient-reported           4/10/2025   Substance Use   Alcohol more than 3/day or more than 7/wk No   Do you use any other substances recreationally? No     Social History     Tobacco Use    Smoking status: Every Day     Current packs/day: 1.00     Average packs/day: 1 pack/day for 20.0 years (20.0 ttl pk-yrs)     Types: Cigarettes    Smokeless tobacco: Never   Vaping Use    Vaping status: Never Used   Substance Use Topics    Alcohol use: Yes     Comment: rare    Drug use: No           6/28/2023   LAST FHS-7 RESULTS   1st degree relative breast or ovarian cancer No   Any relative bilateral breast cancer No   Any male have breast cancer No   Any ONE woman have BOTH breast AND ovarian cancer No   Any woman with breast cancer before 50yrs No   2 or more relatives with breast AND/OR ovarian cancer No   2 or more relatives with breast AND/OR bowel cancer No        Mammogram Screening - Mammogram every 1-2 years updated in Health Maintenance based on mutual decision making        4/10/2025   STI Screening   New sexual partner(s) since last STI/HIV test? No     History of abnormal Pap smear: No - age 30- 64 PAP with HPV every 5 years recommended        Latest Ref Rng & Units 5/9/2019     8:37 AM 5/9/2019     8:30 AM 5/23/2016     7:50 AM   PAP / HPV   PAP (Historical)  NIL      HPV 16 DNA NEG^Negative  Negative  Negative    HPV 18 DNA NEG^Negative   "Negative  Negative    Other HR HPV NEG^Negative  Negative  Negative      ASCVD Risk   The 10-year ASCVD risk score (Jenn MARIN, et al., 2019) is: 4.3%    Values used to calculate the score:      Age: 55 years      Sex: Female      Is Non- : No      Diabetic: No      Tobacco smoker: Yes      Systolic Blood Pressure: 113 mmHg      Is BP treated: No      HDL Cholesterol: 49 mg/dL      Total Cholesterol: 215 mg/dL           Reviewed and updated as needed this visit by Provider   Tobacco  Allergies  Meds  Problems  Med Hx  Surg Hx  Fam Hx                  Review of Systems  Constitutional, HEENT, cardiovascular, pulmonary, GI, , musculoskeletal, neuro, skin, endocrine and psych systems are negative, except as otherwise noted.     Objective    Exam  /74   Pulse 81   Temp 97.6  F (36.4  C) (Temporal)   Resp 15   Ht 1.695 m (5' 6.73\")   Wt 84.6 kg (186 lb 8 oz)   LMP 02/19/2019   SpO2 97%   BMI 29.45 kg/m     Estimated body mass index is 29.45 kg/m  as calculated from the following:    Height as of this encounter: 1.695 m (5' 6.73\").    Weight as of this encounter: 84.6 kg (186 lb 8 oz).    Physical Exam  GENERAL: alert and no distress  EYES: Eyes grossly normal to inspection, PERRL and conjunctivae and sclerae normal  HENT: ear canals and TM's normal, nose and mouth without ulcers or lesions  NECK: no adenopathy, no asymmetry, masses, or scars  RESP: lungs clear to auscultation - no rales, rhonchi or wheezes  BREAST: normal without masses, tenderness or nipple discharge and no palpable axillary masses or adenopathy  CV: regular rate and rhythm, normal S1 S2, no S3 or S4, no murmur, click or rub, no peripheral edema  ABDOMEN: soft, nontender, no hepatosplenomegaly, no masses and bowel sounds normal   (female) w/bimanual: normal female external genitalia, normal urethral meatus, normal vaginal mucosa, and normal cervix/adnexa/uterus without masses or discharge  MS: no " gross musculoskeletal defects noted, no edema  SKIN: no suspicious lesions or rashes  NEURO: Normal strength and tone, mentation intact and speech normal  PSYCH: mentation appears normal, affect normal/bright        Signed Electronically by: Sejal Garcia MD, MD

## 2025-04-15 LAB
BKR AP ASSOCIATED HPV REPORT: NORMAL
BKR LAB AP GYN ADEQUACY: NORMAL
BKR LAB AP GYN INTERPRETATION: NORMAL
BKR LAB AP PREVIOUS ABNORMAL: NORMAL
PATH REPORT.COMMENTS IMP SPEC: NORMAL
PATH REPORT.COMMENTS IMP SPEC: NORMAL
PATH REPORT.RELEVANT HX SPEC: NORMAL

## (undated) DEVICE — SOL WATER IRRIG 1000ML BOTTLE 2F7114

## (undated) DEVICE — KIT ENDO TURNOVER/PROCEDURE CARRY-ON 101822

## (undated) DEVICE — TUBING SUCTION 6"X3/16" N56A